# Patient Record
Sex: FEMALE | Race: BLACK OR AFRICAN AMERICAN | Employment: UNEMPLOYED | URBAN - METROPOLITAN AREA
[De-identification: names, ages, dates, MRNs, and addresses within clinical notes are randomized per-mention and may not be internally consistent; named-entity substitution may affect disease eponyms.]

---

## 2017-03-07 ENCOUNTER — OFFICE VISIT (OUTPATIENT)
Dept: INTERNAL MEDICINE CLINIC | Age: 57
End: 2017-03-07

## 2017-03-07 VITALS
SYSTOLIC BLOOD PRESSURE: 119 MMHG | BODY MASS INDEX: 35.08 KG/M2 | HEIGHT: 59 IN | WEIGHT: 174 LBS | DIASTOLIC BLOOD PRESSURE: 81 MMHG | HEART RATE: 82 BPM | OXYGEN SATURATION: 99 % | RESPIRATION RATE: 18 BRPM | TEMPERATURE: 98.1 F

## 2017-03-07 DIAGNOSIS — Z12.39 BREAST CANCER SCREENING: Primary | ICD-10-CM

## 2017-03-07 DIAGNOSIS — Z71.1 CONCERN ABOUT EYE DISEASE WITHOUT DIAGNOSIS: Primary | ICD-10-CM

## 2017-03-07 NOTE — PROGRESS NOTES
Chief Complaint   Patient presents with    Eye Problem     red spot on left side of eye x 1 week    Leg Pain     felt like a knot left leg on top only one time     1. Have you been to the ER, urgent care clinic since your last visit? Hospitalized since your last visit? No    2. Have you seen or consulted any other health care providers outside of the [facility] since your last visit? Include any pap smears or colon screening.  No

## 2017-03-07 NOTE — PROGRESS NOTES
Subjective: (As above and below)     Chief Complaint   Patient presents with    Eye Problem     red spot on left side of eye x 1 week    Leg Pain     felt like a knot left leg on top only one time    Immunization/Injection     Dannielle Goodwin is a 56y.o. year old female who presents for eye concern:    Her fried noticed a small red dot next to her right pupil approx 1 week ago. It is unchanged since then. She denies any pain, blurred vision, drainage. She does not wear contacts. She uses visine on occasion. She also mentions that after a day of doing yard work - that evening she felt that there was a \"ball inside of her leg that was crawling up her leg\". It occurred once, never again. No current leg problems. Reviewed PmHx, RxHx, FmHx, SocHx, AllgHx and updated in chart. Family History   Problem Relation Age of Onset    Alcohol abuse Mother     Alcohol abuse Father        Past Medical History:   Diagnosis Date    Anemia NEC     Bronchitis     Fibroid     Hypertension     Psychiatric disorder     Schizophrenia ([facility] [address]       Social History     Social History    Marital status: SINGLE     Spouse name: N/A    Number of children: N/A    Years of education: N/A     Social History Main Topics    Smoking status: Former Smoker    Smokeless tobacco: Never Used    Alcohol use No    Drug use: No    Sexual activity: Not Currently     Other Topics Concern    None     Social History Narrative          Current Outpatient Prescriptions   Medication Sig    ibuprofen (MOTRIN) 800 mg tablet TAKE 1 TABLET BY MOUTH EVERY EIGHT (8) HOURS AS NEEDED FOR PAIN. TAKE WITH FOOD.  traZODone (DESYREL) 100 mg tablet TAKE 1 TABLET BY MOUTH AT BEDTIME    lisinopril-hydrochlorothiazide (PRINZIDE, ZESTORETIC) 10-12.5 mg per tablet TAKE 1 TABLET EVERY DAY    benztropine (COGENTIN) 2 mg tablet Take 2 mg by mouth two (2) times a day.     INVEGA SUSTENNA 78 mg/0.5 mL injection     RISPERDAL CONSTA 25 mg/2 mL injection 25 mg by IntraMUSCular route every fourteen (14) days. No current facility-administered medications for this visit. Review of Systems:   Constitutional:    Negative for fever and chills, negative diaphoresis. HEENT:              Negative for neck pain and stiffness. Eyes:                  Negative for visual disturbance, itching, redness or discharge. Respiratory:        Negative for cough and shortness of breath. Cardiovascular:  Negative for chest pain and palpitations. Gastrointestinal: Negative for nausea, vomiting, abdominal pain, diarrhea or constipation. Genitourinary:     Negative for dysuria and frequency. Musculoskeletal: Negative for falls, tenderness and swelling. Skin:                    Negative for rash, masses or lesions. Neurological:       Negative for dizzyness, seizure, loss of consciousness, weakness and numbness. Objective:     Vitals:    03/07/17 1046   BP: 119/81   Pulse: 82   Resp: 18   Temp: 98.1 °F (36.7 °C)   TempSrc: Oral   SpO2: 99%   Weight: 174 lb (78.9 kg)   Height: 4' 11\" (1.499 m)       Results for orders placed or performed in visit on 07/02/14   AMB POC LIPID PROFILE   Result Value Ref Range    Cholesterol (POC) 213     Triglycerides (POC) 175     HDL Cholesterol (POC) 62     LDL Cholesterol (POC) 115     Non-HDL Goal (POC) 150     TChol/HDL Ratio (POC) 3.4          Physical Examination: General appearance - alert, well appearing, and in no distress  Eyes - pupils equal and reactive, extraocular eye movements intact. Small pinpoint red spot to lateral side of right pupil. No drainage, no conjunctivitis. Chest - clear to auscultation, no wheezes, rales or rhonchi, symmetric air entry  Heart - normal rate, regular rhythm, normal S1, S2, no murmurs, rubs, clicks or gallops  Skin - normal coloration and turgor, no rashes, no suspicious skin lesions noted      Assessment/ Plan:   Follow-up Disposition:  Return if symptoms worsen or fail to improve.     Leg: ?return if reccurs    1. Concern about eye disease without diagnosis  Reassured patient, likely small burst blood vessel,however, recc she est care with ophtho for routine eye exam  - REFERRAL TO OPHTHALMOLOGY      I have discussed the diagnosis with the patient and the intended plan as seen in the above orders. The patient has received an after-visit summary and questions were answered concerning future plans. Pt conveyed understanding of plan. Medication Side Effects and Warnings were discussed with patient: yes  Patient Labs were reviewed: yes  Patient Past Records were reviewed:  yes    Mary Lou Kumar.  Elina Solomon NP

## 2017-03-07 NOTE — MR AVS SNAPSHOT
Visit Information Date & Time Provider Department Dept. Phone Encounter #  
 3/7/2017 10:15 AM Ronna ESPINAL Jonna Soriano, [address] [id number] Upcoming Health Maintenance Date Due Hepatitis C Screening 1960 DTaP/Tdap/Td series (1 - Tdap) 3/1/1983 BREAST CANCER SCRN MAMMOGRAM 3/1/2012 FOBT Q 1 YEAR AGE 50-75 3/1/2012 PAP AKA CERVICAL CYTOLOGY 5/16/2015 INFLUENZA AGE 9 TO ADULT 8/1/2016 Allergies as of 3/7/2017  Review Complete On: 3/7/2017 By: Kassidy Alcala LPN No Known Allergies Current Immunizations  Never Reviewed Name Date Influenza Vaccine Intradermal PF 1/19/2015 Influenza Vaccine Split 10/18/2010 Not reviewed this visit You Were Diagnosed With   
  
 Codes Comments Encounter for immunization    -  Primary ICD-10-CM: W74 ICD-9-CM: V03.89 Concern about eye disease without diagnosis     ICD-10-CM: Z71.1 ICD-9-CM: V65.5 Vitals BP Pulse Temp Resp Height(growth percentile) Weight(growth percentile) 119/81 (BP 1 Location: Left arm, BP Patient Position: Sitting) 82 98.1 °F (36.7 °C) (Oral) 18 4' 11\" (1.499 m) 174 lb (78.9 kg) LMP SpO2 BMI OB Status Smoking Status 04/10/2011 99% 35.14 kg/m2 Hysterectomy Former Smoker BMI and BSA Data Body Mass Index Body Surface Area  
 35.14 kg/m 2 1.81 m 2 Preferred Pharmacy Pharmacy Name Phone [id number], [place] - [address] [phone] Your Updated Medication List  
  
   
This list is accurate as of: 3/7/17 11:07 AM.  Always use your most recent med list.  
  
  
  
  
 benztropine 2 mg tablet Commonly known as:  COGENTIN Take 2 mg by mouth two (2) times a day. ibuprofen 800 mg tablet Commonly known as:  MOTRIN  
TAKE 1 TABLET BY MOUTH EVERY EIGHT (8) HOURS AS NEEDED FOR PAIN. TAKE WITH FOOD. INVEGA SUSTENNA 78 mg/0.5 mL injection Generic drug:  Paliperidone Palmitate  
  
 lisinopril-hydroCHLOROthiazide 10-12.5 mg per tablet Commonly known as:  PRINZIDE, ZESTORETIC  
TAKE 1 TABLET EVERY DAY RisperDAL Consta 25 mg/2 mL injection Generic drug:  risperiDONE microspheres 25 mg by IntraMUSCular route every fourteen (14) days. traZODone 100 mg tablet Commonly known as:  DESYREL  
TAKE 1 TABLET BY MOUTH AT BEDTIME We Performed the Following REFERRAL TO OPHTHALMOLOGY [REF57 Custom] Comments:  
 Dr. Seth Danielsonvasyl 30 
[phone] Referral Information Referral ID Referred By Referred To  
  
 [id number] Alejandro Pump Z Not Available Visits Status Start Date End Date 1 New Request 3/7/17 3/7/18 If your referral has a status of pending review or denied, additional information will be sent to support the outcome of this decision. Introducing [facility]! Heriberto Soares introduces [employer] patient portal. Now you can access parts of your medical record, email your doctor's office, and request medication refills online. 1. In your internet browser, go to [email or web address] 2. Click on the First Time User? Click Here link in the Sign In box. You will see the New Member Sign Up page. 3. Enter your [employer] Access Code exactly as it appears below. You will not need to use this code after youve completed the sign-up process. If you do not sign up before the expiration date, you must request a new code. · [employer] Access Code: [email or web address] Expires: 6/5/2017 10:23 AM 
 
4. Enter the last four digits of your Social Security Number (xxxx) and Date of Birth (mm/dd/yyyy) as indicated and click Submit. You will be taken to the next sign-up page. 5. Create a [employer] ID. This will be your [employer] login ID and cannot be changed, so think of one that is secure and easy to remember. 6. Create a [employer] password. You can change your password at any time. 7. Enter your Password Reset Question and Answer. This can be used at a later time if you forget your password. 8. Enter your e-mail address. You will receive e-mail notification when new information is available in [address] 9. Click Sign Up. You can now view and download portions of your medical record. 10. Click the Download Summary menu link to download a portable copy of your medical information. If you have questions, please visit the Frequently Asked Questions section of the [employer] website. Remember, [employer] is NOT to be used for urgent needs. For medical emergencies, dial 911. Now available from your iPhone and Android! Please provide this summary of care documentation to your next provider. Your primary care clinician is listed as Jeannette Krabbe. If you have any questions after today's visit, please call [phone].

## 2017-05-26 ENCOUNTER — OFFICE VISIT (OUTPATIENT)
Dept: INTERNAL MEDICINE CLINIC | Age: 57
End: 2017-05-26

## 2017-05-26 ENCOUNTER — HOSPITAL ENCOUNTER (OUTPATIENT)
Dept: LAB | Age: 57
Discharge: HOME | End: 2017-05-26

## 2017-05-26 VITALS
SYSTOLIC BLOOD PRESSURE: 108 MMHG | OXYGEN SATURATION: 97 % | HEART RATE: 76 BPM | RESPIRATION RATE: 18 BRPM | BODY MASS INDEX: 35.9 KG/M2 | DIASTOLIC BLOOD PRESSURE: 82 MMHG | TEMPERATURE: 98.2 F | WEIGHT: 178.1 LBS | HEIGHT: 59 IN

## 2017-05-26 DIAGNOSIS — Z86.2 HISTORY OF ANEMIA: Primary | ICD-10-CM

## 2017-05-26 DIAGNOSIS — R10.13 EPIGASTRIC PAIN: ICD-10-CM

## 2017-05-26 DIAGNOSIS — Z12.31 SCREENING MAMMOGRAM, ENCOUNTER FOR: ICD-10-CM

## 2017-05-26 DIAGNOSIS — I10 ESSENTIAL HYPERTENSION: ICD-10-CM

## 2017-05-26 DIAGNOSIS — Z12.11 COLON CANCER SCREENING: ICD-10-CM

## 2017-05-26 DIAGNOSIS — Z11.59 SCREENING FOR VIRAL DISEASE: ICD-10-CM

## 2017-05-26 LAB — HGB BLD-MCNC: 10.5 G/DL

## 2017-05-26 PROCEDURE — 99001 SPECIMEN HANDLING PT-LAB: CPT | Performed by: NURSE PRACTITIONER

## 2017-05-26 RX ORDER — BENZTROPINE MESYLATE 0.5 MG/1
TABLET ORAL
Refills: 4 | COMMUNITY
Start: 2017-05-06 | End: 2018-01-02 | Stop reason: SDDI

## 2017-05-26 NOTE — MR AVS SNAPSHOT
Visit Information Date & Time Provider Department Dept. Phone Encounter #  
 5/26/2017  9:45 AM Ronna ESQUIVELSukhdeep Dyer, [address] [id number] Follow-up Instructions Return if symptoms worsen or fail to improve. Upcoming Health Maintenance Date Due Hepatitis C Screening 1960 DTaP/Tdap/Td series (1 - Tdap) 3/1/1983 BREAST CANCER SCRN MAMMOGRAM 3/1/2012 FOBT Q 1 YEAR AGE 50-75 3/1/2012 INFLUENZA AGE 9 TO ADULT 8/1/2017 PAP AKA CERVICAL CYTOLOGY 5/26/2020 Allergies as of 5/26/2017  Review Complete On: 5/26/2017 By: Yonatan Seay LPN No Known Allergies Current Immunizations  Never Reviewed Name Date Influenza Vaccine Intradermal PF 1/19/2015 Influenza Vaccine Split 10/18/2010 Not reviewed this visit You Were Diagnosed With   
  
 Codes Comments History of anemia    -  Primary ICD-10-CM: L61.0 ICD-9-CM: V12.3 Essential hypertension     ICD-10-CM: I10 
ICD-9-CM: 401.9 Screening for viral disease     ICD-10-CM: Z11.59 
ICD-9-CM: V73.99 Epigastric pain     ICD-10-CM: R10.13 ICD-9-CM: 789.06 Screening mammogram, encounter for     ICD-10-CM: Z12.31 
ICD-9-CM: V76.12 Colon cancer screening     ICD-10-CM: Z12.11 ICD-9-CM: V76.51 Vitals BP Pulse Temp Resp Height(growth percentile) Weight(growth percentile) 108/82 (BP 1 Location: Left arm, BP Patient Position: Sitting) 76 98.2 °F (36.8 °C) (Oral) 18 4' 11\" (1.499 m) 178 lb 1.6 oz (80.8 kg) LMP SpO2 BMI OB Status Smoking Status 04/10/2011 97% 35.97 kg/m2 Hysterectomy Former Smoker BMI and BSA Data Body Mass Index Body Surface Area  
 35.97 kg/m 2 1.83 m 2 Preferred Pharmacy Pharmacy Name Phone [id number] [address] [phone] Your Updated Medication List  
  
   
 This list is accurate as of: 5/26/17 10:37 AM.  Always use your most recent med list.  
  
  
  
  
 * benztropine 2 mg tablet Commonly known as:  COGENTIN Take 2 mg by mouth two (2) times a day. * benztropine 0.5 mg tablet Commonly known as:  COGENTIN  
TAKE 1 TABLET BY MOUTH AT BEDTIME  
  
 esomeprazole magnesium 22.3 mg Cpdr  
Commonly known as:  NexIUM 24HR Take 1 Tab by mouth daily. ibuprofen 800 mg tablet Commonly known as:  MOTRIN  
TAKE 1 TABLET BY MOUTH EVERY EIGHT (8) HOURS AS NEEDED FOR PAIN. TAKE WITH FOOD. INVEGA SUSTENNA 78 mg/0.5 mL injection Generic drug:  Paliperidone Palmitate  
  
 lisinopril-hydroCHLOROthiazide 10-12.5 mg per tablet Commonly known as:  PRINZIDE, ZESTORETIC  
TAKE 1 TABLET EVERY DAY RisperDAL Consta 25 mg/2 mL injection Generic drug:  risperiDONE microspheres 25 mg by IntraMUSCular route every fourteen (14) days. traZODone 100 mg tablet Commonly known as:  DESYREL  
TAKE 1 TABLET BY MOUTH AT BEDTIME  
  
 * Notice: This list has 2 medication(s) that are the same as other medications prescribed for you. Read the directions carefully, and ask your doctor or other care provider to review them with you. Prescriptions Sent to Pharmacy Refills  
 esomeprazole magnesium (NEXIUM 24HR) 22.3 mg cpDR 3 Sig: Take 1 Tab by mouth daily. Class: Normal  
 Pharmacy: [facility]/pharmacy [address] [facility] #: [phone] Route: Oral  
  
We Performed the Following AMB POC HEMOGLOBIN (HGB) [99440 CPT(R)] CBC W/O DIFF [19136 CPT(R)] HEPATITIS C AB [91660 CPT(R)] LIPID PANEL [06239 CPT(R)] METABOLIC PANEL, COMPREHENSIVE [66408 CPT(R)] REFERRAL TO GASTROENTEROLOGY [ELZ82 Custom] Comments:  
 Please evaluate patient for colon cancer screen Follow-up Instructions Return if symptoms worsen or fail to improve.   
  
To-Do List   
 05/26/2017 Imaging:  [facility] MAMMO BI SCREENING INCL CAD Referral Information Referral ID Referred By Referred To  
  
 [id number] DELFINA, [address] [facility] [address] Visits Status Start Date End Date 1 New Request 5/26/17 5/26/18 If your referral has a status of pending review or denied, additional information will be sent to support the outcome of this decision. Patient Instructions 1. Stop BC and motrin - tylenol is okay 2. Avoid spicy, greasy foods. Try to eat smaller meals 3. Follow up if worsening 4. Make your appt with stomach doctor for colon cancer screening Gastroesophageal Reflux Disease (GERD): Care Instructions Your Care Instructions Gastroesophageal reflux disease (GERD) is the backward flow of stomach acid into the esophagus. The esophagus is the tube that leads from your throat to your stomach. A one-way valve prevents the stomach acid from moving up into this tube. When you have GERD, this valve does not close tightly enough. If you have mild GERD symptoms including heartburn, you may be able to control the problem with antacids or over-the-counter medicine. Changing your diet, losing weight, and making other lifestyle changes can also help reduce symptoms. Follow-up care is a key part of your treatment and safety. Be sure to make and go to all appointments, and call your doctor if you are having problems. Its also a good idea to know your test results and keep a list of the medicines you take. How can you care for yourself at home? · Take your medicines exactly as prescribed. Call your doctor if you think you are having a problem with your medicine. · Your doctor may recommend over-the-counter medicine. For mild or occasional indigestion, antacids, such as Tums, Gaviscon, Mylanta, or Maalox, may help.  Your doctor also may recommend over-the-counter acid reducers, such as Pepcid AC, Tagamet HB, Zantac 75, or Prilosec. Read and follow all instructions on the label. If you use these medicines often, talk with your doctor. · Change your eating habits. ¨ Its best to eat several small meals instead of two or three large meals. ¨ After you eat, wait 2 to 3 hours before you lie down. ¨ Chocolate, mint, and alcohol can make GERD worse. ¨ Spicy foods, foods that have a lot of acid (like tomatoes and oranges), and coffee can make GERD symptoms worse in some people. If your symptoms are worse after you eat a certain food, you may want to stop eating that food to see if your symptoms get better. · Do not smoke or chew tobacco. Smoking can make GERD worse. If you need help quitting, talk to your doctor about stop-smoking programs and medicines. These can increase your chances of quitting for good. · If you have GERD symptoms at night, raise the head of your bed 6 to 8 inches by putting the frame on blocks or placing a foam wedge under the head of your mattress. (Adding extra pillows does not work.) · Do not wear tight clothing around your middle. · Lose weight if you need to. Losing just 5 to 10 pounds can help. When should you call for help? Call your doctor now or seek immediate medical care if: 
· You have new or different belly pain. · Your stools are black and tarlike or have streaks of blood. Watch closely for changes in your health, and be sure to contact your doctor if: 
· Your symptoms have not improved after 2 days. · Food seems to catch in your throat or chest. 
Where can you learn more? Go to http://patrick-yeny.info/. Enter Y478 in the search box to learn more about \"Gastroesophageal Reflux Disease (GERD): Care Instructions. \" Current as of: August 9, 2016 Content Version: 11.2 © 3859-2974 [employer].  Care instructions adapted under license by [employer] (which disclaims liability or warranty for this information). If you have questions about a medical condition or this instruction, always ask your healthcare professional. [facility] any warranty or liability for your use of this information. Introducing hospitals SERVICES! Luis Bermeo introduces [employer] patient portal. Now you can access parts of your medical record, email your doctor's office, and request medication refills online. 1. In your internet browser, go to [email or web address]://[email or web address]. [employer] 2. Click on the First Time User? Click Here link in the Sign In box. You will see the New Member Sign Up page. 3. Enter your [employer] Access Code exactly as it appears below. You will not need to use this code after youve completed the sign-up process. If you do not sign up before the expiration date, you must request a new code. · [employer] Access Code: [email or web address] Expires: 6/5/2017 11:23 AM 
 
4. Enter the last four digits of your Social Security Number (xxxx) and Date of Birth (mm/dd/yyyy) as indicated and click Submit. You will be taken to the next sign-up page. 5. Create a [employer] ID. This will be your [employer] login ID and cannot be changed, so think of one that is secure and easy to remember. 6. Create a [employer] password. You can change your password at any time. 7. Enter your Password Reset Question and Answer. This can be used at a later time if you forget your password. 8. Enter your e-mail address. You will receive e-mail notification when new information is available in [address] 9. Click Sign Up. You can now view and download portions of your medical record. 10. Click the Download Summary menu link to download a portable copy of your medical information. If you have questions, please visit the Frequently Asked Questions section of the [employer] website. Remember, [employer] is NOT to be used for urgent needs. For medical emergencies, dial 911. Now available from your iPhone and Android! Please provide this summary of care documentation to your next provider. Your primary care clinician is listed as Kyleigh Fulton. If you have any questions after today's visit, please call [phone].

## 2017-05-26 NOTE — PROGRESS NOTES
Subjective: (As above and below)     Chief Complaint   Patient presents with    Abdominal Pain     a couple of weeks     Ashlie Westbrook is a 56y.o. year old female who presents for abdominal pain for approx 2 weeks. She has epigastric pain x 2 weeks. It is unchanged. She has mild nausea in the morning which improves with bowel movement. Her BMs are normal. She is not vomiting. Her pain is not radiating. She is passing gas. No fevers. She was recently in [address] [place] [address] - she admits to eating spicy foods. She has been taking BC powder for her symptoms. Reviewed PmHx, RxHx, FmHx, SocHx, AllgHx and updated in chart. Family History   Problem Relation Age of Onset    Alcohol abuse Mother     Alcohol abuse Father        Past Medical History:   Diagnosis Date    Anemia NEC     Bronchitis     Fibroid     Hypertension     Psychiatric disorder     Schizophrenia ([facility] [address]       Social History     Social History    Marital status: SINGLE     Spouse name: N/A    Number of children: N/A    Years of education: N/A     Social History Main Topics    Smoking status: Former Smoker    Smokeless tobacco: Never Used    Alcohol use No    Drug use: No    Sexual activity: Not Currently     Other Topics Concern    None     Social History Narrative          Current Outpatient Prescriptions   Medication Sig    benztropine (COGENTIN) 0.5 mg tablet TAKE 1 TABLET BY MOUTH AT BEDTIME    esomeprazole magnesium (NEXIUM 24HR) 22.3 mg cpDR Take 1 Tab by mouth daily.  ibuprofen (MOTRIN) 800 mg tablet TAKE 1 TABLET BY MOUTH EVERY EIGHT (8) HOURS AS NEEDED FOR PAIN. TAKE WITH FOOD.  INVEGA SUSTENNA 78 mg/0.5 mL injection     traZODone (DESYREL) 100 mg tablet TAKE 1 TABLET BY MOUTH AT BEDTIME    lisinopril-hydrochlorothiazide (PRINZIDE, ZESTORETIC) 10-12.5 mg per tablet TAKE 1 TABLET EVERY DAY    RISPERDAL CONSTA 25 mg/2 mL injection 25 mg by IntraMUSCular route every fourteen (14) days.     benztropine (COGENTIN) 2 mg tablet Take 2 mg by mouth two (2) times a day. No current facility-administered medications for this visit. Review of Systems:   Constitutional:    Negative for fever and chills, negative diaphoresis. HEENT:              Negative for neck pain and stiffness. Eyes:                  Negative for visual disturbance, itching, redness or discharge. Respiratory:        Negative for cough and shortness of breath. Cardiovascular:  Negative for chest pain and palpitations. Gastrointestinal: Negative for vomiting,diarrhea or constipation. +nausea, epigastric pain  Genitourinary:     Negative for dysuria and frequency. Musculoskeletal: Negative for falls, tenderness and swelling. Skin:                    Negative for rash, masses or lesions. Neurological:       Negative for dizzyness, seizure, loss of consciousness, weakness and numbness. Objective:     Vitals:    05/26/17 0944   BP: 108/82   Pulse: 76   Resp: 18   Temp: 98.2 °F (36.8 °C)   TempSrc: Oral   SpO2: 97%   Weight: 178 lb 1.6 oz (80.8 kg)   Height: 4' 11\" (1.499 m)       Results for orders placed or performed in visit on 05/26/17   AMB POC HEMOGLOBIN (HGB)   Result Value Ref Range    Hemoglobin (POC) 10.5          Physical Examination: General appearance - alert, well appearing, and in no distress  Chest - clear to auscultation, no wheezes, rales or rhonchi, symmetric air entry  Heart - normal rate, regular rhythm, normal S1, S2, no murmurs, rubs, clicks or gallops  Abdomen - soft, mildly distended. BS active. Mild discomfort with palpation to epigastric area     Assessment/ Plan:   Follow-up Disposition:  Return if symptoms worsen or fail to improve.     1. History of anemia    - CBC W/O DIFF  - AMB POC HEMOGLOBIN (HGB)    2. Essential hypertension    - METABOLIC PANEL, COMPREHENSIVE  - LIPID PANEL    3. Screening for viral disease    - HEPATITIS C AB    4. Epigastric pain  Avoid spicy foods, STOP bc powder and motrin, tylenol okay  - esomeprazole magnesium (NEXIUM 24HR) 22.3 mg cpDR; Take 1 Tab by mouth daily. Dispense: 30 Tab; Refill: 3    5. Screening mammogram, encounter for    - [facility] MAMMO BI SCREENING INCL CAD; Future    6. Colon cancer screening    - REFERRAL TO GASTROENTEROLOGY        I have discussed the diagnosis with the patient and the intended plan as seen in the above orders. The patient has received an after-visit summary and questions were answered concerning future plans. Pt conveyed understanding of plan. Medication Side Effects and Warnings were discussed with patient: yes  Patient Labs were reviewed: yes  Patient Past Records were reviewed:  yes    Josh Coffman.  Prudencio Rosales NP

## 2017-05-26 NOTE — PATIENT INSTRUCTIONS
1. Stop BC and motrin - tylenol is okay  2. Avoid spicy, greasy foods. Try to eat smaller meals  3. Follow up if worsening  4. Make your appt with stomach doctor for colon cancer screening       Gastroesophageal Reflux Disease (GERD): Care Instructions  Your Care Instructions    Gastroesophageal reflux disease (GERD) is the backward flow of stomach acid into the esophagus. The esophagus is the tube that leads from your throat to your stomach. A one-way valve prevents the stomach acid from moving up into this tube. When you have GERD, this valve does not close tightly enough. If you have mild GERD symptoms including heartburn, you may be able to control the problem with antacids or over-the-counter medicine. Changing your diet, losing weight, and making other lifestyle changes can also help reduce symptoms. Follow-up care is a key part of your treatment and safety. Be sure to make and go to all appointments, and call your doctor if you are having problems. Its also a good idea to know your test results and keep a list of the medicines you take. How can you care for yourself at home? · Take your medicines exactly as prescribed. Call your doctor if you think you are having a problem with your medicine. · Your doctor may recommend over-the-counter medicine. For mild or occasional indigestion, antacids, such as Tums, Gaviscon, Mylanta, or Maalox, may help. Your doctor also may recommend over-the-counter acid reducers, such as Pepcid AC, Tagamet HB, Zantac 75, or Prilosec. Read and follow all instructions on the label. If you use these medicines often, talk with your doctor. · Change your eating habits. ¨ Its best to eat several small meals instead of two or three large meals. ¨ After you eat, wait 2 to 3 hours before you lie down. ¨ Chocolate, mint, and alcohol can make GERD worse.   ¨ Spicy foods, foods that have a lot of acid (like tomatoes and oranges), and coffee can make GERD symptoms worse in some people. If your symptoms are worse after you eat a certain food, you may want to stop eating that food to see if your symptoms get better. · Do not smoke or chew tobacco. Smoking can make GERD worse. If you need help quitting, talk to your doctor about stop-smoking programs and medicines. These can increase your chances of quitting for good. · If you have GERD symptoms at night, raise the head of your bed 6 to 8 inches by putting the frame on blocks or placing a foam wedge under the head of your mattress. (Adding extra pillows does not work.)  · Do not wear tight clothing around your middle. · Lose weight if you need to. Losing just 5 to 10 pounds can help. When should you call for help? Call your doctor now or seek immediate medical care if:  · You have new or different belly pain. · Your stools are black and tarlike or have streaks of blood. Watch closely for changes in your health, and be sure to contact your doctor if:  · Your symptoms have not improved after 2 days. · Food seems to catch in your throat or chest.  Where can you learn more? Go to http://patrick-yeny.info/. Enter G948 in the search box to learn more about \"Gastroesophageal Reflux Disease (GERD): Care Instructions. \"  Current as of: August 9, 2016  Content Version: 11.2  © 2829-9763 [employer]. Care instructions adapted under license by [employer] (which disclaims liability or warranty for this information). If you have questions about a medical condition or this instruction, always ask your healthcare professional. [place] [address] any warranty or liability for your use of this information.

## 2017-05-27 LAB
ALBUMIN SERPL-MCNC: 4.5 G/DL (ref 3.5–5.5)
ALBUMIN/GLOB SERPL: 1.4 {RATIO} (ref 1.2–2.2)
ALP SERPL-CCNC: 73 IU/L (ref 39–117)
ALT SERPL-CCNC: 22 IU/L (ref 0–32)
AST SERPL-CCNC: 25 IU/L (ref 0–40)
BILIRUB SERPL-MCNC: <0.2 MG/DL (ref 0–1.2)
BUN SERPL-MCNC: 10 MG/DL (ref 6–24)
BUN/CREAT SERPL: 11 (ref 9–23)
CALCIUM SERPL-MCNC: 9.9 MG/DL (ref 8.7–10.2)
CHLORIDE SERPL-SCNC: 97 MMOL/L (ref 96–106)
CHOLEST SERPL-MCNC: 233 MG/DL (ref 100–199)
CO2 SERPL-SCNC: 28 MMOL/L (ref 18–29)
CREAT SERPL-MCNC: 0.92 MG/DL (ref 0.57–1)
ERYTHROCYTE [DISTWIDTH] IN BLOOD BY AUTOMATED COUNT: 15.7 % (ref 12.3–15.4)
GLOBULIN SER CALC-MCNC: 3.2 G/DL (ref 1.5–4.5)
GLUCOSE SERPL-MCNC: 118 MG/DL (ref 65–99)
HCT VFR BLD AUTO: 40.5 % (ref 34–46.6)
HCV AB S/CO SERPL IA: 8.8 S/CO RATIO (ref 0–0.9)
HDLC SERPL-MCNC: 81 MG/DL
HGB BLD-MCNC: 13.1 G/DL (ref 11.1–15.9)
INTERPRETATION, 910389: NORMAL
LDLC SERPL CALC-MCNC: 122 MG/DL (ref 0–99)
MCH RBC QN AUTO: 28.4 PG (ref 26.6–33)
MCHC RBC AUTO-ENTMCNC: 32.3 G/DL (ref 31.5–35.7)
MCV RBC AUTO: 88 FL (ref 79–97)
PLATELET # BLD AUTO: 308 X10E3/UL (ref 150–379)
POTASSIUM SERPL-SCNC: 4.2 MMOL/L (ref 3.5–5.2)
PROT SERPL-MCNC: 7.7 G/DL (ref 6–8.5)
RBC # BLD AUTO: 4.62 X10E6/UL (ref 3.77–5.28)
SODIUM SERPL-SCNC: 142 MMOL/L (ref 134–144)
TRIGL SERPL-MCNC: 152 MG/DL (ref 0–149)
VLDLC SERPL CALC-MCNC: 30 MG/DL (ref 5–40)
WBC # BLD AUTO: 4.3 X10E3/UL (ref 3.4–10.8)

## 2017-05-31 ENCOUNTER — TELEPHONE (OUTPATIENT)
Dept: INTERNAL MEDICINE CLINIC | Age: 57
End: 2017-05-31

## 2017-05-31 PROBLEM — E78.2 MIXED HYPERLIPIDEMIA: Status: ACTIVE | Noted: 2017-05-31

## 2017-05-31 PROBLEM — R76.8 HEPATITIS C ANTIBODY POSITIVE IN BLOOD: Status: ACTIVE | Noted: 2017-05-31

## 2017-05-31 NOTE — TELEPHONE ENCOUNTER
Verified   Called with labs  Finding of Hep C ab + - she has no know hx of hep C  She happens to be going to GI appt today - will fax lab over

## 2017-06-08 DIAGNOSIS — Z12.11 COLON CANCER SCREENING: Primary | ICD-10-CM

## 2017-06-08 RX ORDER — LISINOPRIL AND HYDROCHLOROTHIAZIDE 10; 12.5 MG/1; MG/1
TABLET ORAL
Qty: 90 TAB | Refills: 2 | Status: SHIPPED | OUTPATIENT
Start: 2017-06-08 | End: 2017-09-23 | Stop reason: SDUPTHER

## 2017-07-18 ENCOUNTER — TELEPHONE (OUTPATIENT)
Dept: INTERNAL MEDICINE CLINIC | Age: 57
End: 2017-07-18

## 2017-07-18 NOTE — TELEPHONE ENCOUNTER
Pt has concerns regarding letter generated cancelling pharmacies.  Best contact number [phone].

## 2017-09-26 RX ORDER — LISINOPRIL AND HYDROCHLOROTHIAZIDE 10; 12.5 MG/1; MG/1
TABLET ORAL
Qty: 30 TAB | Refills: 2 | Status: SHIPPED | OUTPATIENT
Start: 2017-09-26 | End: 2017-09-27 | Stop reason: SDUPTHER

## 2017-09-27 ENCOUNTER — OFFICE VISIT (OUTPATIENT)
Dept: INTERNAL MEDICINE CLINIC | Age: 57
End: 2017-09-27

## 2017-09-27 VITALS
HEIGHT: 59 IN | TEMPERATURE: 98.2 F | HEART RATE: 69 BPM | OXYGEN SATURATION: 94 % | RESPIRATION RATE: 18 BRPM | WEIGHT: 176.1 LBS | DIASTOLIC BLOOD PRESSURE: 70 MMHG | SYSTOLIC BLOOD PRESSURE: 106 MMHG | BODY MASS INDEX: 35.5 KG/M2

## 2017-09-27 DIAGNOSIS — I10 ESSENTIAL HYPERTENSION: Primary | ICD-10-CM

## 2017-09-27 DIAGNOSIS — Z12.39 SCREENING FOR BREAST CANCER: ICD-10-CM

## 2017-09-27 DIAGNOSIS — Z12.11 SCREENING FOR COLON CANCER: ICD-10-CM

## 2017-09-27 RX ORDER — HYDROCHLOROTHIAZIDE 12.5 MG/1
12.5 TABLET ORAL DAILY
Qty: 30 TAB | Refills: 1 | Status: SHIPPED | OUTPATIENT
Start: 2017-09-27 | End: 2017-11-25 | Stop reason: SDUPTHER

## 2017-09-27 NOTE — PROGRESS NOTES
Subjective: (As above and below)     Chief Complaint   Patient presents with   Theone Serge is a 56y.o. year old female who presents for f/u on abdominal pain. She states that GERD symptoms have not resolved but have significantly improved. She states that she tried to get colonoscopy but could not due to the out of pocket costs. She states that she did see them for +hep c ab and was cleared - will request recs    Reviewed PmHx, RxHx, FmHx, SocHx, AllgHx and updated in chart. Family History   Problem Relation Age of Onset    Alcohol abuse Mother     Alcohol abuse Father        Past Medical History:   Diagnosis Date    Anemia NEC     Bronchitis     Fibroid     Hypertension     Psychiatric disorder     Schizophrenia ([facility] [address]       Social History     Social History    Marital status: SINGLE     Spouse name: N/A    Number of children: N/A    Years of education: N/A     Social History Main Topics    Smoking status: Former Smoker    Smokeless tobacco: Never Used    Alcohol use No    Drug use: No    Sexual activity: Not Currently     Other Topics Concern    None     Social History Narrative          Current Outpatient Prescriptions   Medication Sig    hydroCHLOROthiazide (HYDRODIURIL) 12.5 mg tablet Take 1 Tab by mouth daily.  benztropine (COGENTIN) 0.5 mg tablet TAKE 1 TABLET BY MOUTH AT BEDTIME    ibuprofen (MOTRIN) 800 mg tablet TAKE 1 TABLET BY MOUTH EVERY EIGHT (8) HOURS AS NEEDED FOR PAIN. TAKE WITH FOOD.  esomeprazole magnesium (NEXIUM 24HR) 22.3 mg cpDR Take 1 Tab by mouth daily.  INVEGA SUSTENNA 78 mg/0.5 mL injection     RISPERDAL CONSTA 25 mg/2 mL injection 25 mg by IntraMUSCular route every fourteen (14) days. No current facility-administered medications for this visit. Review of Systems:   Constitutional:    Negative for fever and chills, negative diaphoresis. HEENT:              Negative for neck pain and stiffness.   Eyes: Negative for visual disturbance, itching, redness or discharge. Respiratory:        Negative for cough and shortness of breath. Cardiovascular:  Negative for chest pain and palpitations. Gastrointestinal: Negative for nausea, vomiting, abdominal pain, diarrhea or constipation. Genitourinary:     Negative for dysuria and frequency. Musculoskeletal: Negative for falls, tenderness and swelling. Skin:                    Negative for rash, masses or lesions. Neurological:       Negative for dizzyness, seizure, loss of consciousness, weakness and numbness. Objective:     Vitals:    09/27/17 0940   BP: 106/70   Pulse: 69   Resp: 18   Temp: 98.2 °F (36.8 °C)   TempSrc: Oral   SpO2: 94%   Weight: 176 lb 1.6 oz (79.9 kg)   Height: 4' 11\" (1.499 m)           Physical Examination: General appearance - alert, well appearing, and in no distress  Chest - clear to auscultation, no wheezes, rales or rhonchi, symmetric air entry  Heart - normal rate, regular rhythm, normal S1, S2, no murmurs, rubs, clicks or gallops  Extremities - No lower extremity edema       Assessment/ Plan:   Follow-up Disposition:  Return for nv bp check. 1. Essential hypertension  She asks if she can be off bp med - bp has been low-normal, will dc lisinopril  - hydroCHLOROthiazide (HYDRODIURIL) 12.5 mg tablet; Take 1 Tab by mouth daily. Dispense: 30 Tab; Refill: 1    2. Screening for colon cancer  If unable to get colonoscopy, recc  - OCCULT BLOOD, IMMUNOASSAY (FIT)    3. Screening for breast cancer    - [facility] MAMMO BI SCREENING INCL CAD; Future        I have discussed the diagnosis with the patient and the intended plan as seen in the above orders. The patient has received an after-visit summary and questions were answered concerning future plans. Pt conveyed understanding of plan.       Medication Side Effects and Warnings were discussed with patient: yes  Patient Labs were reviewed: yes  Patient Past Records were reviewed: yes    Ronna Sam Age, NP

## 2017-09-27 NOTE — MR AVS SNAPSHOT
Visit Information Date & Time Provider Department Dept. Phone Encounter #  
 9/27/2017 10:15 AM Ronna ESQUIVELSukhdeep Kristoferpo Sena, [address] [id number] Follow-up Instructions Return for nv bp check. Your Appointments 9/27/2017 10:15 AM  
ROUTINE CARE with Ronnamanolo Sena, NP  
[address] [facility] [address] [place] [address]) Appt Note: check up; check up [place] [address] [facility] 7 [address]  
[phone]  
  
   
 [place] [address] Upcoming Health Maintenance Date Due DTaP/Tdap/Td series (1 - Tdap) 3/1/1983 BREAST CANCER SCRN MAMMOGRAM 3/1/2012 FOBT Q 1 YEAR AGE 50-75 3/1/2012 PAP AKA CERVICAL CYTOLOGY 5/26/2020 Allergies as of 9/27/2017  Review Complete On: 9/27/2017 By: Ilda Zuleta LPN No Known Allergies Current Immunizations  Never Reviewed Name Date Influenza Vaccine Intradermal PF 1/19/2015 Influenza Vaccine Split 10/18/2010 Not reviewed this visit You Were Diagnosed With   
  
 Codes Comments Essential hypertension    -  Primary ICD-10-CM: I10 
ICD-9-CM: 401.9 Screening for colon cancer     ICD-10-CM: Z12.11 ICD-9-CM: V76.51 Screening for breast cancer     ICD-10-CM: Z12.39 
ICD-9-CM: V76.10 Vitals BP Pulse Temp Resp Height(growth percentile) Weight(growth percentile) 106/70 (BP 1 Location: Left arm, BP Patient Position: Sitting) 69 98.2 °F (36.8 °C) (Oral) 18 4' 11\" (1.499 m) 176 lb 1.6 oz (79.9 kg) LMP SpO2 BMI OB Status Smoking Status 04/10/2011 94% 35.57 kg/m2 Hysterectomy Former Smoker BMI and BSA Data Body Mass Index Body Surface Area 35.57 kg/m 2 1.82 m 2 Preferred Pharmacy Pharmacy Name Phone [id number]- [place], [place] - [address] [phone] Your Updated Medication List  
  
   
 This list is accurate as of: 9/27/17 10:03 AM.  Always use your most recent med list.  
  
  
  
  
 benztropine 0.5 mg tablet Commonly known as:  COGENTIN  
TAKE 1 TABLET BY MOUTH AT BEDTIME  
  
 esomeprazole magnesium 22.3 mg Cpdr  
Commonly known as:  NexIUM 24HR Take 1 Tab by mouth daily. hydroCHLOROthiazide 12.5 mg tablet Commonly known as:  HYDRODIURIL Take 1 Tab by mouth daily. ibuprofen 800 mg tablet Commonly known as:  MOTRIN  
TAKE 1 TABLET BY MOUTH EVERY EIGHT (8) HOURS AS NEEDED FOR PAIN. TAKE WITH FOOD. INVEGA SUSTENNA 78 mg/0.5 mL injection Generic drug:  Paliperidone Palmitate RisperDAL Consta 25 mg/2 mL injection Generic drug:  risperiDONE microspheres 25 mg by IntraMUSCular route every fourteen (14) days. Prescriptions Sent to Pharmacy Refills  
 hydroCHLOROthiazide (HYDRODIURIL) 12.5 mg tablet 1 Sig: Take 1 Tab by mouth daily. Class: Normal  
 Pharmacy: [facility]/pharmacy [address] [place] [address] [address]  #: [phone] Route: Oral  
  
We Performed the Following OCCULT BLOOD, IMMUNOASSAY (FIT) [id number] CPT(R)] Follow-up Instructions Return for nv bp check. To-Do List   
 09/27/2017 Imaging:  [facility] MAMMO BI SCREENING INCL CAD Patient Instructions Fecal Immunochemical Test (FIT): About This Test 
What is it? A fecal immunochemical test, or FIT, checks for hidden blood in the stool. Your doctor gives you a kit that contains everything you need. At home, you follow simple steps to collect a small amount of stool. You return the kit to the doctor or to a lab. Why is this test done? This test is done to check for colorectal cancer and other types of gastrointestinal problems, such as hemorrhoids, anal fissures, and colon polyps, that can cause blood in the stools.  
How can you prepare for the test? 
 · Don't do the test during your menstrual period or if you are having bleeding from hemorrhoids. What happens during the test? 
There are different types of home tests available. It is important to follow the instructions provided with any test. 
Here are some general instructions: 
· Check the expiration date on the package. Don't use a test kit after its expiration date. · Follow the instructions exactly. Do all the steps, in order, without skipping any of them. · After you finish your test, follow the instructions that you were given for returning the test. 
There is a FIT test that shows the results right away. If your test shows that blood was found in your stool sample, call your doctor as soon as possible. Follow-up care is a key part of your treatment and safety. Be sure to make and go to all appointments, and call your doctor if you are having problems. It's also a good idea to keep a list of the medicines you take. Ask your doctor when you can expect to have your test results. Where can you learn more? Go to http://patrick-yeny.info/. Enter F692 in the search box to learn more about \"Fecal Immunochemical Test (FIT): About This Test.\" Current as of: July 28, 2016 Content Version: 11.3 © 7163-5372 [employer]. Care instructions adapted under license by [employer] (which disclaims liability or warranty for this information). If you have questions about a medical condition or this instruction, always ask your healthcare professional. [place] [address] any warranty or liability for your use of this information. Introducing [facility] & HE[facility]! Marilou Villa introduces Plored patient portal. Now you can access parts of your medical record, email your doctor's office, and request medication refills online. 1. In your internet browser, go to [email or web address] 2. Click on the First Time User? Click Here link in the Sign In box. You will see the New Member Sign Up page. 3. Enter your LineHop Access Code exactly as it appears below. You will not need to use this code after youve completed the sign-up process. If you do not sign up before the expiration date, you must request a new code. · LineHop Access Code: [email or web address] Expires: 12/26/2017  9:22 AM 
 
4. Enter the last four digits of your Social Security Number (xxxx) and Date of Birth (mm/dd/yyyy) as indicated and click Submit. You will be taken to the next sign-up page. 5. Create a LineHop ID. This will be your LineHop login ID and cannot be changed, so think of one that is secure and easy to remember. 6. Create a LineHop password. You can change your password at any time. 7. Enter your Password Reset Question and Answer. This can be used at a later time if you forget your password. 8. Enter your e-mail address. You will receive e-mail notification when new information is available in [address] 9. Click Sign Up. You can now view and download portions of your medical record. 10. Click the Download Summary menu link to download a portable copy of your medical information. If you have questions, please visit the Frequently Asked Questions section of the LineHop website. Remember, LineHop is NOT to be used for urgent needs. For medical emergencies, dial 911. Now available from your iPhone and Android! Please provide this summary of care documentation to your next provider. Your primary care clinician is listed as Manuelito Valenzuela. If you have any questions after today's visit, please call [phone].

## 2017-09-27 NOTE — PATIENT INSTRUCTIONS
Fecal Immunochemical Test (FIT): About This Test  What is it? A fecal immunochemical test, or FIT, checks for hidden blood in the stool. Your doctor gives you a kit that contains everything you need. At home, you follow simple steps to collect a small amount of stool. You return the kit to the doctor or to a lab. Why is this test done? This test is done to check for colorectal cancer and other types of gastrointestinal problems, such as hemorrhoids, anal fissures, and colon polyps, that can cause blood in the stools. How can you prepare for the test?  · Don't do the test during your menstrual period or if you are having bleeding from hemorrhoids. What happens during the test?  There are different types of home tests available. It is important to follow the instructions provided with any test.  Here are some general instructions:  · Check the expiration date on the package. Don't use a test kit after its expiration date. · Follow the instructions exactly. Do all the steps, in order, without skipping any of them. · After you finish your test, follow the instructions that you were given for returning the test.  There is a FIT test that shows the results right away. If your test shows that blood was found in your stool sample, call your doctor as soon as possible. Follow-up care is a key part of your treatment and safety. Be sure to make and go to all appointments, and call your doctor if you are having problems. It's also a good idea to keep a list of the medicines you take. Ask your doctor when you can expect to have your test results. Where can you learn more? Go to http://patrick-yeny.info/. Enter K823 in the search box to learn more about \"Fecal Immunochemical Test (FIT): About This Test.\"  Current as of: July 28, 2016  Content Version: 11.3  © 9586-9038 [employer].  Care instructions adapted under license by [employer] (which disclaims liability or warranty for this information). If you have questions about a medical condition or this instruction, always ask your healthcare professional. [place] [address] any warranty or liability for your use of this information.

## 2017-09-27 NOTE — PROGRESS NOTES
Pt here for   Chief Complaint   Patient presents with    Follow-up     Blood Pressure     1. Have you been to the ER, urgent care clinic since your last visit? Hospitalized since your last visit? No    2. Have you seen or consulted any other health care providers outside of the [address] [place] [address] since your last visit? Include any pap smears or colon screening.  No     Pt denies pain at this time      PHQ over the last two weeks 9/27/2017   PHQ Not Done -   Little interest or pleasure in doing things Not at all   Feeling down, depressed or hopeless Not at all   Total Score PHQ 2 0

## 2017-11-25 DIAGNOSIS — I10 ESSENTIAL HYPERTENSION: ICD-10-CM

## 2017-11-27 RX ORDER — HYDROCHLOROTHIAZIDE 12.5 MG/1
TABLET ORAL
Qty: 30 TAB | Refills: 1 | Status: SHIPPED | OUTPATIENT
Start: 2017-11-27 | End: 2018-01-29 | Stop reason: SDUPTHER

## 2017-12-04 DIAGNOSIS — G89.29 CHRONIC PAIN OF LEFT KNEE: ICD-10-CM

## 2017-12-04 DIAGNOSIS — M25.562 CHRONIC PAIN OF LEFT KNEE: ICD-10-CM

## 2017-12-04 RX ORDER — IBUPROFEN 800 MG/1
TABLET ORAL
Qty: 60 TAB | Refills: 5 | Status: SHIPPED | OUTPATIENT
Start: 2017-12-04 | End: 2018-05-25 | Stop reason: SDUPTHER

## 2017-12-22 ENCOUNTER — OFFICE VISIT (OUTPATIENT)
Dept: INTERNAL MEDICINE CLINIC | Age: 57
End: 2017-12-22

## 2017-12-22 VITALS
SYSTOLIC BLOOD PRESSURE: 128 MMHG | OXYGEN SATURATION: 98 % | RESPIRATION RATE: 20 BRPM | DIASTOLIC BLOOD PRESSURE: 88 MMHG | TEMPERATURE: 97.8 F | HEART RATE: 84 BPM | HEIGHT: 59 IN | BODY MASS INDEX: 35.88 KG/M2 | WEIGHT: 178 LBS

## 2017-12-22 DIAGNOSIS — I10 ESSENTIAL HYPERTENSION: Primary | ICD-10-CM

## 2017-12-22 DIAGNOSIS — F20.9 SCHIZOPHRENIA, UNSPECIFIED TYPE (HCC): ICD-10-CM

## 2017-12-22 NOTE — PROGRESS NOTES
Marcelo Smith is a 56 y.o. female and presents with Leg Pain  . Subjective:    Hypertension Review:  The patient has essential hypertension  Diet and Lifestyle: generally follows a  low sodium diet, exercises sporadically  Home BP Monitoring: is not measured at home. Pertinent ROS: taking medications as instructed, no medication side effects noted, no TIA's, no chest pain on exertion, no dyspnea on exertion, no swelling of ankles. BP Readings from Last 3 Encounters:   12/22/17 128/88   09/27/17 106/70   05/26/17 108/82       Schizophrenia-noted     Hep C-?? Evaluated by GI already. Records requested  Review of Systems  Constitutional: negative for fevers, chills, anorexia and weight loss  Eyes:   negative for visual disturbance and irritation  ENT:   negative for tinnitus,sore throat,nasal congestion,ear pains. hoarseness  Respiratory:  negative for cough, hemoptysis, dyspnea,wheezing  CV:   negative for chest pain, palpitations, lower extremity edema  GI:   negative for nausea, vomiting, diarrhea, abdominal pain,melena  Endo:               negative for polyuria,polydipsia,polyphagia,heat intolerance  Genitourinary: negative for frequency, dysuria and hematuria  Integument:  negative for rash and pruritus  Hematologic:  negative for easy bruising and gum/nose bleeding  Musculoskel: negative for myalgias, arthralgias, back pain, muscle weakness, joint pain  Neurological:  negative for headaches, dizziness, vertigo, memory problems and gait   Behavl/Psych: negative for feelings of anxiety, depression, mood changes    Past Medical History:   Diagnosis Date    Anemia NEC     Bronchitis     Fibroid     Hypertension     Positive hepatitis C antibody test 09/2017    [facility]    Psychiatric disorder     Schizophrenia [facility])      Past Surgical History:   Procedure Laterality Date    HX MYOMECTOMY       Social History     Social History    Marital status: SINGLE     Spouse name: N/A  Number of children: N/A    Years of education: N/A     Social History Main Topics    Smoking status: Former Smoker    Smokeless tobacco: Never Used    Alcohol use No    Drug use: No    Sexual activity: Not Currently     Other Topics Concern    None     Social History Narrative     Family History   Problem Relation Age of Onset    Alcohol abuse Mother     Alcohol abuse Father      Current Outpatient Prescriptions   Medication Sig Dispense Refill    ibuprofen (MOTRIN) 800 mg tablet TAKE 1 TABLET BY MOUTH EVERY EIGHT (8) HOURS AS NEEDED FOR PAIN. TAKE WITH FOOD. 60 Tab 5    hydroCHLOROthiazide (HYDRODIURIL) 12.5 mg tablet TAKE 1 TAB BY MOUTH DAILY. 30 Tab 1    INVEGA SUSTENNA 78 mg/0.5 mL injection       esomeprazole magnesium (NEXIUM 24HR) 22.3 mg cpDR Take 1 Tab by mouth daily. 30 Tab 3     No Known Allergies    Objective:  Visit Vitals    /88 (BP 1 Location: Left arm, BP Patient Position: Sitting)    Pulse 84    Temp 97.8 °F (36.6 °C) (Oral)    Resp 20    Ht 4' 11\" (1.499 m)    Wt 178 lb (80.7 kg)    LMP 04/10/2011    SpO2 98%    BMI 35.95 kg/m2     Physical Exam:   General appearance - alert, well appearing, and in no distress obese  Mental status - alert, oriented to person, place, and time  EYE-EOMI  Mouth - mucous membranes moist, pharynx normal without lesions  Neck - supple, no significant adenopathy   Chest - clear to auscultation, no wheezes, rales or rhonchi, symmetric air entry   Heart - normal rate, regular rhythm, normal S1, S2  Abdomen - soft, obese  Ext-peripheral pulses normal, no pedal edema, no clubbing or cyanosis  Skin-Warm and dry.  no hyperpigmentation, vitiligo, or suspicious lesions  Neuro -alert, oriented, normal speech, no focal findings or movement disorder noted      Results for orders placed or performed in visit on 66/47/68   METABOLIC PANEL, COMPREHENSIVE   Result Value Ref Range    Glucose 118 (H) 65 - 99 mg/dL    BUN 10 6 - 24 mg/dL    Creatinine 0.92 0.57 - 1.00 mg/dL    GFR est non-AA 70 >59 mL/min/1.73    GFR est AA 81 >59 mL/min/1.73    BUN/Creatinine ratio 11 9 - 23    Sodium 142 134 - 144 mmol/L    Potassium 4.2 3.5 - 5.2 mmol/L    Chloride 97 96 - 106 mmol/L    CO2 28 18 - 29 mmol/L    Calcium 9.9 8.7 - 10.2 mg/dL    Protein, total 7.7 6.0 - 8.5 g/dL    Albumin 4.5 3.5 - 5.5 g/dL    GLOBULIN, TOTAL 3.2 1.5 - 4.5 g/dL    A-G Ratio 1.4 1.2 - 2.2    Bilirubin, total <0.2 0.0 - 1.2 mg/dL    Alk. phosphatase 73 39 - 117 IU/L    AST (SGOT) 25 0 - 40 IU/L    ALT (SGPT) 22 0 - 32 IU/L   LIPID PANEL   Result Value Ref Range    Cholesterol, total 233 (H) 100 - 199 mg/dL    Triglyceride 152 (H) 0 - 149 mg/dL    HDL Cholesterol 81 >39 mg/dL    VLDL, calculated 30 5 - 40 mg/dL    LDL, calculated 122 (H) 0 - 99 mg/dL   CBC W/O DIFF   Result Value Ref Range    WBC 4.3 3.4 - 10.8 x10E3/uL    RBC 4.62 3.77 - 5.28 x10E6/uL    HGB 13.1 11.1 - 15.9 g/dL    HCT 40.5 34.0 - 46.6 %    MCV 88 79 - 97 fL    MCH 28.4 26.6 - 33.0 pg    MCHC 32.3 31.5 - 35.7 g/dL    RDW 15.7 (H) 12.3 - 15.4 %    PLATELET 184 534 - 623 x10E3/uL   HEPATITIS C AB   Result Value Ref Range    Hep C Virus Ab 8.8 (H) 0.0 - 0.9 s/co ratio   CVD REPORT   Result Value Ref Range    INTERPRETATION Note    AMB POC HEMOGLOBIN (HGB)   Result Value Ref Range    Hemoglobin (POC) 10.5        Assessment/Plan:    ICD-10-CM ICD-9-CM    1. Essential hypertension I10 401.9    2. [facility] unspecified [facility] F20.9 295.90      No orders of the defined types were placed in this encounter. lose weight, increase physical activity, continue present plan,Take 81mg aspirin daily  Patient Instructions        Low Sodium Diet (2,000 Milligram): Care Instructions  Your Care Instructions    Too much sodium causes your body to hold on to extra water. This can raise your blood pressure and force your heart and kidneys to work harder.  In very serious cases, this could cause you to be put in the hospital. It might even be life-threatening. By limiting sodium, you will feel better and lower your risk of serious problems. The most common source of sodium is salt. People get most of the salt in their diet from canned, prepared, and packaged foods. Fast food and restaurant meals also are very high in sodium. Your doctor will probably limit your sodium to less than 2,000 milligrams (mg) a day. This limit counts all the sodium in prepared and packaged foods and any salt you add to your food. Follow-up care is a key part of your treatment and safety. Be sure to make and go to all appointments, and call your doctor if you are having problems. It's also a good idea to know your test results and keep a list of the medicines you take. How can you care for yourself at home? Read food labels  · Read labels on cans and food packages. The labels tell you how much sodium is in each serving. Make sure that you look at the serving size. If you eat more than the serving size, you have eaten more sodium. · Food labels also tell you the Percent Daily Value for sodium. Choose products with low Percent Daily Values for sodium. · Be aware that sodium can come in forms other than salt, including monosodium glutamate (MSG), sodium citrate, and sodium bicarbonate (baking soda). MSG is often added to Asian food. When you eat out, you can sometimes ask for food without MSG or added salt. Buy low-sodium foods  · Buy foods that are labeled \"unsalted\" (no salt added), \"sodium-free\" (less than 5 mg of sodium per serving), or \"low-sodium\" (less than 140 mg of sodium per serving). Foods labeled \"reduced-sodium\" and \"light sodium\" may still have too much sodium. Be sure to read the label to see how much sodium you are getting. · Buy fresh vegetables, or frozen vegetables without added sauces. Buy low-sodium versions of canned vegetables, soups, and other canned goods. Prepare low-sodium meals  · Cut back on the amount of salt you use in cooking.  This will help you adjust to the taste. Do not add salt after cooking. One teaspoon of salt has about 2,300 mg of sodium. · Take the salt shaker off the table. · Flavor your food with garlic, lemon juice, onion, vinegar, herbs, and spices. Do not use soy sauce, lite soy sauce, steak sauce, onion salt, garlic salt, celery salt, mustard, or ketchup on your food. · Use low-sodium salad dressings, sauces, and ketchup. Or make your own salad dressings and sauces without adding salt. · Use less salt (or none) when recipes call for it. You can often use half the salt a recipe calls for without losing flavor. Other foods such as rice, pasta, and grains do not need added salt. · Rinse canned vegetables, and cook them in fresh water. This removes some-but not all-of the salt. · Avoid water that is naturally high in sodium or that has been treated with water softeners, which add sodium. Call your local water company to find out the sodium content of your water supply. If you buy bottled water, read the label and choose a sodium-free brand. Avoid high-sodium foods  · Avoid eating:  ¨ Smoked, cured, salted, and canned meat, fish, and poultry. ¨ Ham, davis, hot dogs, and luncheon meats. ¨ Regular, hard, and processed cheese and regular peanut butter. ¨ Crackers with salted tops, and other salted snack foods such as pretzels, chips, and salted popcorn. ¨ Frozen prepared meals, unless labeled low-sodium. ¨ Canned and dried soups, broths, and bouillon, unless labeled sodium-free or low-sodium. ¨ Canned vegetables, unless labeled sodium-free or low-sodium. ¨ [place] fries, pizza, tacos, and other fast foods. ¨ Pickles, olives, ketchup, and other condiments, especially soy sauce, unless labeled sodium-free or low-sodium. Where can you learn more? Go to http://patrick-yeny.info/. Enter T083 in the search box to learn more about \"Low Sodium Diet (2,000 Milligram): Care Instructions. \"  Current as of:  May 12, 2017  Content Version: 11.4  © 6310-5640 Healthwise, Incorporated. Care instructions adapted under license by [employer] (which disclaims liability or warranty for this information). If you have questions about a medical condition or this instruction, always ask your healthcare professional. [address] any warranty or liability for your use of this information. Follow-up Disposition:  Return in about 3 months (around 3/22/2018) for bp check. I have reviewed with the patient details of the assessment and plan and all questions were answered. Relevent patient education was performed. The most recent lab findings were reviewed with the patient. An After Visit Summary was printed and given to the patient.

## 2017-12-22 NOTE — PROGRESS NOTES
1. Have you been to the ER, urgent care clinic since your last visit? Hospitalized since your last visit? No.    2. Have you seen or consulted any other health care providers outside of the [address] [place] [address] since your last visit? Include any pap smears or colon screening.  No.

## 2018-01-02 NOTE — PATIENT INSTRUCTIONS

## 2018-01-22 ENCOUNTER — OFFICE VISIT (OUTPATIENT)
Dept: INTERNAL MEDICINE CLINIC | Age: 58
End: 2018-01-22

## 2018-01-22 VITALS
HEIGHT: 59 IN | TEMPERATURE: 98.2 F | BODY MASS INDEX: 34.51 KG/M2 | SYSTOLIC BLOOD PRESSURE: 136 MMHG | WEIGHT: 171.2 LBS | HEART RATE: 94 BPM | DIASTOLIC BLOOD PRESSURE: 88 MMHG | OXYGEN SATURATION: 99 % | RESPIRATION RATE: 20 BRPM

## 2018-01-22 DIAGNOSIS — S83.91XA SPRAIN OF RIGHT KNEE, UNSPECIFIED LIGAMENT, INITIAL ENCOUNTER: Primary | ICD-10-CM

## 2018-01-22 DIAGNOSIS — F20.9 SCHIZOPHRENIA, UNSPECIFIED TYPE (HCC): ICD-10-CM

## 2018-01-22 DIAGNOSIS — R76.8 HEPATITIS C ANTIBODY POSITIVE IN BLOOD: ICD-10-CM

## 2018-01-22 DIAGNOSIS — E78.2 MIXED HYPERLIPIDEMIA: ICD-10-CM

## 2018-01-22 NOTE — PROGRESS NOTES
1. Have you been to the ER, urgent care clinic since your last visit? Hospitalized since your last visit? No.    2. Have you seen or consulted any other health care providers outside of the [address] [place] [address] since your last visit? Include any pap smears or colon screening.  No.

## 2018-01-22 NOTE — PATIENT INSTRUCTIONS
Low-Fat Diet : Care Instructions  Your Care Instructions    Follow-up care is a key part of your treatment and safety. Be sure to make and go to all appointments, and call your doctor if you are having problems. It's also a good idea to know your test results and keep a list of the medicines you take. How can you care for yourself at home? · Eat many small meals and snacks each day instead of three large meals. · Choose lean meats. ¨ Eat no more than 5 to 6½ ounces of meat a day. ¨ Cut off all fat you can see. ¨ Eat chicken and turkey without the skin. ¨ Many types of fish, such as salmon, lake trout, tuna, and herring, provide healthy omega-3 fat. But, avoid fish canned in oil, such as sardines in olive oil. ¨ Bake, broil, or grill meats, poultry, or fish instead of frying them in butter or fat. · Drink or eat nonfat or low-fat milk, yogurt, cheese, or other milk products each day. ¨ Read the labels on cheeses, and choose those with less than 5 grams of fat an ounce. ¨ Try fat-free sour cream, cream cheese, or yogurt. ¨ Avoid cream soups and cream sauces on pasta. ¨ Eat low-fat ice cream, frozen yogurt, or sorbet. Avoid regular ice cream.  · Eat whole-grain cereals, breads, crackers, rice, or pasta. Avoid high-fat foods such as croissants, scones, biscuits, waffles, doughnuts, muffins, granola, and high-fat breads. · Flavor your foods with herbs and spices (such as basil, tarragon, or mint), fat-free sauces, or lemon juice instead of butter. You can also use butter substitutes, fat-free mayonnaise, or fat-free dressing. · Try applesauce, prune puree, or mashed bananas to replace some or all of the fat when you bake. · Limit fats and oils, such as butter, margarine, mayonnaise, and salad dressing, to no more than 1 tablespoon a meal.  · Avoid high-fat foods, such as:  ¨ Chocolate, whole milk, ice cream, and processed cheese. ¨ Fried or buttered foods. ¨ Sausage, salami, and davis.   [place] rolls, cakes, pies, cookies, and other pastries. ¨ Prepared snack foods, such as potato chips, nut and granola bars, and mixed nuts. ¨ Coconut and avocado. · Learn how to read food labels for serving sizes and ingredients. Fast-food and convenience-food meals often have lots of fat. Where can you learn more? Go to http://patrick-yeny.info/. Enter S962 in the search box to learn more about \"Low-Fat Diet for Gallbladder Disease: Care Instructions. \"  Current as of: May 12, 2017  Content Version: 11.4  © 7744-9076 [employer]. Care instructions adapted under license by [employer] (which disclaims liability or warranty for this information). If you have questions about a medical condition or this instruction, always ask your healthcare professional. [address] any warranty or liability for your use of this information.

## 2018-01-22 NOTE — PROGRESS NOTES
Brenden Hobson is a 56 y.o. female and presents with Fall; Knee Swelling (right); and Medication Refill  . Subjective:    S/p fall 5 days ago. She slipped going down her stairs. Pts rt knee has been swollen and painful. She has been soaking in hot tub w epsom salt and the sxs have improved. She is able to walk. The swelling has gone down significantly    Pt also wants to be retested for hep C. Pt relays she saw GI in May and was told she does NOT have hep C. Consult notes received do not mention retesting. Pt was supposed to schedule colonoscopy, but did not f/u bc she was told she \"might have to pay\". Pt also requests that she recheck her cholesterol as she was told it was elevated. Lab Results   Component Value Date/Time    Cholesterol, total 233 05/26/2017 12:00 AM    Cholesterol (POC) 213 07/02/2014 11:15 AM    HDL Cholesterol 81 05/26/2017 12:00 AM    HDL Cholesterol (POC) 62 07/02/2014 11:15 AM    LDL Cholesterol (POC) 115 07/02/2014 11:15 AM    LDL, calculated 122 05/26/2017 12:00 AM    VLDL, calculated 30 05/26/2017 12:00 AM    Triglyceride 152 05/26/2017 12:00 AM    Triglycerides (POC) 175 07/02/2014 11:15 AM       Review of Systems  Review of systems (12) negative, except noted above.       Past Medical History:   Diagnosis Date    Anemia NEC     Bronchitis     Fibroid     Hypertension     Positive hepatitis C antibody test 09/2017    [facility]    Psychiatric disorder     Schizophrenia ([address]      Past Surgical History:   Procedure Laterality Date    HX MYOMECTOMY       Social History     Social History    Marital status: SINGLE     Spouse name: N/A    Number of children: N/A    Years of education: N/A     Social History Main Topics    Smoking status: Former Smoker    Smokeless tobacco: Never Used    Alcohol use No    Drug use: No    Sexual activity: Not Currently     Other Topics Concern    None     Social History Narrative     Family History Problem Relation Age of Onset    Alcohol abuse Mother     Alcohol abuse Father      Current Outpatient Prescriptions   Medication Sig Dispense Refill    hydroCHLOROthiazide (HYDRODIURIL) 12.5 mg tablet TAKE 1 TAB BY MOUTH DAILY. 30 Tab 1    INVEGA SUSTENNA 78 mg/0.5 mL injection       ibuprofen (MOTRIN) 800 mg tablet TAKE 1 TABLET BY MOUTH EVERY EIGHT (8) HOURS AS NEEDED FOR PAIN. TAKE WITH FOOD. 60 Tab 5    esomeprazole magnesium (NEXIUM 24HR) 22.3 mg cpDR Take 1 Tab by mouth daily. 30 Tab 3     No Known Allergies    Objective:  Visit Vitals    /88 (BP 1 Location: Left arm, BP Patient Position: Sitting)    Pulse 94    Temp 98.2 °F (36.8 °C) (Oral)    Resp 20    Ht 4' 11\" (1.499 m)    Wt 171 lb 3.2 oz (77.7 kg)    LMP 04/10/2011    SpO2 99%    BMI 34.58 kg/m2     Physical Exam:   General appearance - alert, obese, pleasant lady w wig  Mental status - alert, oriented to person, place, and time  EYE-EOMI  Neck - supple,   Chest - symmetric air entry    Abdomen - obese  Ext-left knee:nml ROM, no crepitis RT knee: + edema superiorly, AYANA, no crepitis  Skin-Warm and dry. no hyperpigmentation, vitiligo, or suspicious lesions  Neuro -alert, oriented, normal speech, no focal findings or movement disorder noted        Results for orders placed or performed in visit on 89/35/79   METABOLIC PANEL, COMPREHENSIVE   Result Value Ref Range    Glucose 118 (H) 65 - 99 mg/dL    BUN 10 6 - 24 mg/dL    Creatinine 0.92 0.57 - 1.00 mg/dL    GFR est non-AA 70 >59 mL/min/1.73    GFR est AA 81 >59 mL/min/1.73    BUN/Creatinine ratio 11 9 - 23    Sodium 142 134 - 144 mmol/L    Potassium 4.2 3.5 - 5.2 mmol/L    Chloride 97 96 - 106 mmol/L    CO2 28 18 - 29 mmol/L    Calcium 9.9 8.7 - 10.2 mg/dL    Protein, total 7.7 6.0 - 8.5 g/dL    Albumin 4.5 3.5 - 5.5 g/dL    GLOBULIN, TOTAL 3.2 1.5 - 4.5 g/dL    A-G Ratio 1.4 1.2 - 2.2    Bilirubin, total <0.2 0.0 - 1.2 mg/dL    Alk.  phosphatase 73 39 - 117 IU/L    AST (SGOT) 25 0 - 40 IU/L    ALT (SGPT) 22 0 - 32 IU/L   LIPID PANEL   Result Value Ref Range    Cholesterol, total 233 (H) 100 - 199 mg/dL    Triglyceride 152 (H) 0 - 149 mg/dL    HDL Cholesterol 81 >39 mg/dL    VLDL, calculated 30 5 - 40 mg/dL    LDL, calculated 122 (H) 0 - 99 mg/dL   CBC W/O DIFF   Result Value Ref Range    WBC 4.3 3.4 - 10.8 x10E3/uL    RBC 4.62 3.77 - 5.28 x10E6/uL    HGB 13.1 11.1 - 15.9 g/dL    HCT 40.5 34.0 - 46.6 %    MCV 88 79 - 97 fL    MCH 28.4 26.6 - 33.0 pg    MCHC 32.3 31.5 - 35.7 g/dL    RDW 15.7 (H) 12.3 - 15.4 %    PLATELET 107 902 - 588 x10E3/uL   HEPATITIS C AB   Result Value Ref Range    Hep C Virus Ab 8.8 (H) 0.0 - 0.9 s/co ratio   CVD REPORT   Result Value Ref Range    INTERPRETATION Note    AMB POC HEMOGLOBIN (HGB)   Result Value Ref Range    Hemoglobin (POC) 10.5        Assessment/Plan:    ICD-10-CM ICD-9-CM    1. Sprain of right knee, unspecified ligament, initial encounter S83. 91XA 844.9    2. Mixed hyperlipidemia E78.2 272.2 LIPID PANEL      HEPATITIS C QT BY PCR WITH REFLEX GENOTYPE   3. Hepatitis C antibody positive in blood R76.8 795.79 LIPID PANEL      HEPATITIS C QT BY PCR WITH REFLEX GENOTYPE   4. Schizophrenia, unspecified type ([email or web address] F20.9 295.90      Orders Placed This Encounter    LIPID PANEL    HEPATITIS C QT BY PCR WITH REFLEX GENOTYPE     Recommend otc NSAID prn  Check lipids and hep c quant  F/u PCP routine/prn  Patient Instructions        Low-Fat Diet : Care Instructions  Your Care Instructions    Follow-up care is a key part of your treatment and safety. Be sure to make and go to all appointments, and call your doctor if you are having problems. It's also a good idea to know your test results and keep a list of the medicines you take. How can you care for yourself at home? · Eat many small meals and snacks each day instead of three large meals. · Choose lean meats. ¨ Eat no more than 5 to 6½ ounces of meat a day. ¨ Cut off all fat you can see.   ¨ Eat chicken and turkey without the skin. ¨ Many types of fish, such as salmon, lake trout, tuna, and herring, provide healthy omega-3 fat. But, avoid fish canned in oil, such as sardines in olive oil. ¨ Bake, broil, or grill meats, poultry, or fish instead of frying them in butter or fat. · Drink or eat nonfat or low-fat milk, yogurt, cheese, or other milk products each day. ¨ Read the labels on cheeses, and choose those with less than 5 grams of fat an ounce. ¨ Try fat-free sour cream, cream cheese, or yogurt. ¨ Avoid cream soups and cream sauces on pasta. ¨ Eat low-fat ice cream, frozen yogurt, or sorbet. Avoid regular ice cream.  · Eat whole-grain cereals, breads, crackers, rice, or pasta. Avoid high-fat foods such as croissants, scones, biscuits, waffles, doughnuts, muffins, granola, and high-fat breads. · Flavor your foods with herbs and spices (such as basil, tarragon, or mint), fat-free sauces, or lemon juice instead of butter. You can also use butter substitutes, fat-free mayonnaise, or fat-free dressing. · Try applesauce, prune puree, or mashed bananas to replace some or all of the fat when you bake. · Limit fats and oils, such as butter, margarine, mayonnaise, and salad dressing, to no more than 1 tablespoon a meal.  · Avoid high-fat foods, such as:  ¨ Chocolate, whole milk, ice cream, and processed cheese. ¨ Fried or buttered foods. ¨ Sausage, salami, and davis. ¨ Cinnamon rolls, cakes, pies, cookies, and other pastries. ¨ Prepared snack foods, such as potato chips, nut and granola bars, and mixed nuts. ¨ Coconut and avocado. · Learn how to read food labels for serving sizes and ingredients. Fast-food and convenience-food meals often have lots of fat. Where can you learn more? Go to http://agustin.info/. Enter Q785 in the search box to learn more about \"Low-Fat Diet for Gallbladder Disease: Care Instructions. \"  Current as of:  May 12, 2017  Content Version: 11.4  © 5533-7179 Healthwise, Incorporated. Care instructions adapted under license by [employer] (which disclaims liability or warranty for this information). If you have questions about a medical condition or this instruction, always ask your healthcare professional. [address]  any warranty or liability for your use of this information. Follow-up Disposition:  Return in about 3 months (around 4/22/2018) for f/u Ronna. I have reviewed with the patient details of the assessment and plan and all questions were answered. Relevent patient education was performed. The most recent lab findings were reviewed with the patient. An After Visit Summary was printed and given to the patient.

## 2018-01-22 NOTE — MR AVS SNAPSHOT
[address] [facility] [address] [place] [address] 
[phone] Patient: Herr Cough MRN: [id number] QSL:[birth year] Visit Information Date & Time Provider Department Dept. Phone Encounter #  
 1/22/2018 10:40 AM Ana Sandoval, [address] [id number] Follow-up Instructions Return in about 3 months (around 4/22/2018) for f/u Ronna. Upcoming Health Maintenance Date Due DTaP/Tdap/Td series (1 - Tdap) 3/1/1983 BREAST CANCER SCRN MAMMOGRAM 3/1/2012 FOBT Q 1 YEAR AGE 50-75 3/1/2012 PAP AKA CERVICAL CYTOLOGY 5/26/2020 Allergies as of 1/22/2018  Review Complete On: 1/22/2018 By: Bell Otero LPN No Known Allergies Current Immunizations  Never Reviewed Name Date Influenza Vaccine Intradermal PF 1/19/2015 Influenza Vaccine Split 10/18/2010 Not reviewed this visit You Were Diagnosed With   
  
 Codes Comments Mixed hyperlipidemia    -  Primary ICD-10-CM: A49.4 ICD-9-CM: 272.2 Hepatitis C antibody positive in blood     ICD-10-CM: R76.8 ICD-9-CM: 795.79 Schizophrenia, unspecified type ([facility].)     ICD-10-CM: F20.9 ICD-9-CM: 295.90 Vitals BP Pulse Temp Resp Height(growth percentile) Weight(growth percentile) 136/88 (BP 1 Location: Left arm, BP Patient Position: Sitting) 94 98.2 °F (36.8 °C) (Oral) 20 4' 11\" (1.499 m) 171 lb 3.2 oz (77.7 kg) LMP SpO2 BMI OB Status Smoking Status 04/10/2011 99% 34.58 kg/m2 Hysterectomy Former Smoker Vitals History BMI and BSA Data Body Mass Index Body Surface Area 34.58 kg/m 2 1.8 m 2 Preferred Pharmacy Pharmacy Name Phone [id number], [place] - [address] [phone] Your Updated Medication List  
  
   
This list is accurate as of: 1/22/18 11:40 AM.  Always use your most recent med list.  
  
  
  
  
 esomeprazole magnesium 22.3 mg Cpdr  
Commonly known as:  NexIUM 24HR Take 1 Tab by mouth daily. hydroCHLOROthiazide 12.5 mg tablet Commonly known as:  HYDRODIURIL  
TAKE 1 TAB BY MOUTH DAILY. ibuprofen 800 mg tablet Commonly known as:  MOTRIN  
TAKE 1 TABLET BY MOUTH EVERY EIGHT (8) HOURS AS NEEDED FOR PAIN. TAKE WITH FOOD. INVEGA SUSTENNA 78 mg/0.5 mL injection Generic drug:  Paliperidone Palmitate We Performed the Following HEPATITIS C QT BY PCR WITH REFLEX GENOTYPE [[id number] Custom] LIPID PANEL [32150 CPT(R)] Follow-up Instructions Return in about 3 months (around 4/22/2018) for f/u Ronna. Patient Instructions Low-Fat Diet : Care Instructions Your Care Instructions Follow-up care is a key part of your treatment and safety. Be sure to make and go to all appointments, and call your doctor if you are having problems. It's also a good idea to know your test results and keep a list of the medicines you take. How can you care for yourself at home? · Eat many small meals and snacks each day instead of three large meals. · Choose lean meats. ¨ Eat no more than 5 to 6½ ounces of meat a day. ¨ Cut off all fat you can see. ¨ Eat chicken and turkey without the skin. ¨ Many types of fish, such as salmon, lake trout, tuna, and herring, provide healthy omega-3 fat. But, avoid fish canned in oil, such as sardines in olive oil. ¨ Bake, broil, or grill meats, poultry, or fish instead of frying them in butter or fat. · Drink or eat nonfat or low-fat milk, yogurt, cheese, or other milk products each day. ¨ Read the labels on cheeses, and choose those with less than 5 grams of fat an ounce. ¨ Try fat-free sour cream, cream cheese, or yogurt. ¨ Avoid cream soups and cream sauces on pasta. ¨ Eat low-fat ice cream, frozen yogurt, or sorbet. Avoid regular ice cream. 
· Eat whole-grain cereals, breads, crackers, rice, or pasta.  Avoid high-fat foods such as croissants, scones, biscuits, waffles, doughnuts, muffins, granola, and high-fat breads. · Flavor your foods with herbs and spices (such as basil, tarragon, or mint), fat-free sauces, or lemon juice instead of butter. You can also use butter substitutes, fat-free mayonnaise, or fat-free dressing. · Try applesauce, prune puree, or mashed bananas to replace some or all of the fat when you bake. · Limit fats and oils, such as butter, margarine, mayonnaise, and salad dressing, to no more than 1 tablespoon a meal. 
· Avoid high-fat foods, such as: 
¨ Chocolate, whole milk, ice cream, and processed cheese. ¨ Fried or buttered foods. ¨ Sausage, salami, and davis. ¨ Cinnamon rolls, cakes, pies, cookies, and other pastries. ¨ Prepared snack foods, such as potato chips, nut and granola bars, and mixed nuts. ¨ Coconut and avocado. · Learn how to read food labels for serving sizes and ingredients. Fast-food and convenience-food meals often have lots of fat. Where can you learn more? Go to http://patrick-yeny.info/. Enter N958 in the search box to learn more about \"Low-Fat Diet for Gallbladder Disease: Care Instructions. \" Current as of: May 12, 2017 Content Version: 11.4 © 8249-9201 [employer]. Care instructions adapted under license by [employer] (which disclaims liability or warranty for this information). If you have questions about a medical condition or this instruction, always ask your healthcare professional. [place] [address] any warranty or liability for your use of this information. Introducing [facility]! Sami Hernandez introduces [employer] patient portal. Now you can access parts of your medical record, email your doctor's office, and request medication refills online. 1. In your internet browser, go to [email or web address] 2. Click on the First Time User? Click Here link in the Sign In box. You will see the New Member Sign Up page. 3. Enter your [employer] Access Code exactly as it appears below. You will not need to use this code after youve completed the sign-up process. If you do not sign up before the expiration date, you must request a new code. · [employer] Access Code: [email or web address] Expires: 4/22/2018 11:40 AM 
 
4. Enter the last four digits of your Social Security Number (xxxx) and Date of Birth (mm/dd/yyyy) as indicated and click Submit. You will be taken to the next sign-up page. 5. Create a [employer] ID. This will be your [employer] login ID and cannot be changed, so think of one that is secure and easy to remember. 6. Create a [employer] password. You can change your password at any time. 7. Enter your Password Reset Question and Answer. This can be used at a later time if you forget your password. 8. Enter your e-mail address. You will receive e-mail notification when new information is available in [address] 9. Click Sign Up. You can now view and download portions of your medical record. 10. Click the Download Summary menu link to download a portable copy of your medical information. If you have questions, please visit the Frequently Asked Questions section of the [employer] website. Remember, [employer] is NOT to be used for urgent needs. For medical emergencies, dial 911. Now available from your iPhone and Android! Please provide this summary of care documentation to your next provider. Your primary care clinician is listed as Vivian Cordoba. If you have any questions after today's visit, please call [phone].

## 2018-01-31 LAB
CHOLEST SERPL-MCNC: 208 MG/DL (ref 100–199)
HCV GENOTYPE: NORMAL
HCV RNA SERPL NAA+PROBE-ACNC: NORMAL IU/ML
HCV RNA SERPL NAA+PROBE-LOG IU: NORMAL LOG10 IU/ML
HDLC SERPL-MCNC: 66 MG/DL
INTERPRETATION, 910389: NORMAL
LDLC SERPL CALC-MCNC: 124 MG/DL (ref 0–99)
TEST INFORMATION: NORMAL
TRIGL SERPL-MCNC: 90 MG/DL (ref 0–149)
VLDLC SERPL CALC-MCNC: 18 MG/DL (ref 5–40)

## 2018-05-25 DIAGNOSIS — M25.562 CHRONIC PAIN OF LEFT KNEE: ICD-10-CM

## 2018-05-25 DIAGNOSIS — G89.29 CHRONIC PAIN OF LEFT KNEE: ICD-10-CM

## 2018-05-25 RX ORDER — IBUPROFEN 800 MG/1
TABLET ORAL
Qty: 60 TAB | Refills: 5 | Status: SHIPPED | OUTPATIENT
Start: 2018-05-25 | End: 2019-01-24 | Stop reason: SDUPTHER

## 2018-06-25 ENCOUNTER — OFFICE VISIT (OUTPATIENT)
Dept: INTERNAL MEDICINE CLINIC | Age: 58
End: 2018-06-25

## 2018-06-25 VITALS
DIASTOLIC BLOOD PRESSURE: 84 MMHG | OXYGEN SATURATION: 98 % | HEART RATE: 100 BPM | WEIGHT: 163.9 LBS | BODY MASS INDEX: 33.04 KG/M2 | RESPIRATION RATE: 18 BRPM | HEIGHT: 59 IN | TEMPERATURE: 98.7 F | SYSTOLIC BLOOD PRESSURE: 126 MMHG

## 2018-06-25 DIAGNOSIS — M25.461 SWELLING OF KNEE JOINT, RIGHT: Primary | ICD-10-CM

## 2018-06-25 DIAGNOSIS — R73.09 ELEVATED GLUCOSE: ICD-10-CM

## 2018-06-25 NOTE — MR AVS SNAPSHOT
[address] [facility] [address] [address] 
[phone] Patient: Dave Hernandez MRN: [id number] KSW:[birth year] Visit Information Date & Time Provider Department Dept. Phone Encounter #  
 6/25/2018 10:45 AM Alma Rodriguez, [address] [id number] Follow-up Instructions Return if symptoms worsen or fail to improve. Upcoming Health Maintenance Date Due DTaP/Tdap/Td series (1 - Tdap) 3/1/1983 BREAST CANCER SCRN MAMMOGRAM 3/1/2012 FOBT Q 1 YEAR AGE 50-75 3/1/2012 Influenza Age 5 to Adult 8/1/2018 PAP AKA CERVICAL CYTOLOGY 5/26/2020 Allergies as of 6/25/2018  Review Complete On: 6/25/2018 By: Alma Rodriguez MD  
 No Known Allergies Current Immunizations  Never Reviewed Name Date Influenza Vaccine Intradermal PF 1/19/2015 Influenza Vaccine Split 10/18/2010 Not reviewed this visit You Were Diagnosed With   
  
 Codes Comments Swelling of knee joint, right    -  Primary ICD-10-CM: M25.461 ICD-9-CM: 719.06 Elevated glucose     ICD-10-CM: R73.09 
ICD-9-CM: 790.29 Vitals BP Pulse Temp Resp Height(growth percentile) Weight(growth percentile) 126/84 (BP 1 Location: Left arm, BP Patient Position: Sitting) 100 98.7 °F (37.1 °C) (Oral) 18 4' 11\" (1.499 m) 163 lb 14.4 oz (74.3 kg) LMP SpO2 BMI OB Status Smoking Status 04/10/2011 98% 33.1 kg/m2 Hysterectomy Former Smoker Vitals History BMI and BSA Data Body Mass Index Body Surface Area  
 33.1 kg/m 2 1.76 m 2 Preferred Pharmacy Pharmacy Name Phone [id number]- [place], [place] - [address] [phone] Your Updated Medication List  
  
   
This list is accurate as of 6/25/18 11:07 AM.  Always use your most recent med list.  
  
  
  
  
 esomeprazole magnesium 22.3 mg Cpdr  
Commonly known as:  NexIUM 24HR Take 1 Tab by mouth daily. hydroCHLOROthiazide 12.5 mg tablet Commonly known as:  HYDRODIURIL  
TAKE 1 TAB BY MOUTH DAILY. ibuprofen 800 mg tablet Commonly known as:  MOTRIN  
TAKE 1 TABLET BY MOUTH EVERY EIGHT (8) HOURS AS NEEDED FOR PAIN. TAKE WITH FOOD. INVEGA SUSTENNA 78 mg/0.5 mL injection Generic drug:  Paliperidone Palmitate Leg Brace Misc Commonly known as:  ACE KNEE BRACE  
by Does Not Apply route. Rt knee ACE bandage Prescriptions Sent to Pharmacy Refills Leg Brace (ACE KNEE BRACE) misc 0 Sig: by Does Not Apply route. Rt knee ACE bandage Class: Normal  
 [facility] [id number] [facility] [address] Ph #: [phone] Route: Does Not Apply We Performed the Following HEMOGLOBIN A1C WITH EAG [56812 CPT(R)] METABOLIC PANEL, BASIC [51710 CPT(R)] REFERRAL TO ORTHOPEDICS [[id number] Custom] Follow-up Instructions Return if symptoms worsen or fail to improve. Referral Information Referral ID Referred By Referred To  
  
 [id number] Nani CLANCY MD   
   [address] [facility] [address] [facility], [address] Phone: [phone] Fax: [phone] Visits Status Start Date End Date 1 New Request 6/25/18 6/25/19 If your referral has a status of pending review or denied, additional information will be sent to support the outcome of this decision. Patient Instructions Knee Sprain: Care Instructions Your Care Instructions A knee sprain is one or more stretched, partly torn, or completely torn knee ligaments. Ligaments are bands of ropelike tissue that connect bone to bone and make the knee stable. The knee has four main ligaments. Knee sprains often happen because of a twisting or bending injury from sports such as skiing, basketball, soccer, or football.  The knee turns one way while the lower or upper leg goes another way. A sprain also can happen when the knee is hit from the side or the front. If a knee ligament is slightly stretched, you will probably need only home treatment. You may need a splint or brace (immobilizer) for a partly torn ligament. A complete tear may need surgery. A minor knee sprain may take up to 6 weeks to heal, while a severe sprain may take months. Follow-up care is a key part of your treatment and safety. Be sure to make and go to all appointments, and call your doctor if you are having problems. It's also a good idea to know your test results and keep a list of the medicines you take. How can you care for yourself at home? · Follow instructions about how much weight you can put on your leg and how to walk with crutches. · Prop up your leg on a pillow when you ice it or anytime you sit or lie down for the next 3 days. Try to keep it above the level of your heart. This will help reduce swelling. · Put ice or a cold pack on your knee for 10 to 20 minutes at a time. Try to do this every 1 to 2 hours for the next 3 days (when you are awake) or until the swelling goes down. Put a thin cloth between the ice and your skin. Do not get the splint wet. · If you have an elastic bandage, make sure it is snug but not so tight that your leg is numb, tingles, or swells below the bandage. You can loosen the bandage if it is too tight. · Your doctor may recommend a brace (immobilizer) to support your knee while it heals. Wear it as directed. · Ask your doctor if you can take an over-the-counter pain medicine, such as acetaminophen (Tylenol), ibuprofen (Advil, Motrin), or naproxen (Aleve). Be safe with medicines. Read and follow all instructions on the label. When should you call for help? Call 911 anytime you think you may need emergency care. For example, call if: 
? · You have sudden chest pain and shortness of breath, or you cough up blood. ?Call your doctor now or seek immediate medical care if: 
? · You have increased or severe pain. ? · You cannot move your toes or ankle. ? · Your foot is cool or pale or changes color. ? · You have tingling, weakness, or numbness in your foot or leg. ? · Your splint or brace feels too tight. ? · You are unable to straighten the knee, or the knee \"locks. \"  
? · You have signs of a blood clot in your leg, such as: 
¨ Pain in your calf, back of the knee, thigh, or groin. ¨ Redness and swelling in your leg. ? Watch closely for changes in your health, and be sure to contact your doctor if: 
? · Your pain is not getting better or is getting worse. Where can you learn more? Go to http://patrick-yeny.info/. Enter N406 in the search box to learn more about \"Knee Sprain: Care Instructions. \" Current as of: March 21, 2017 Content Version: 11.4 © 9561-8757 [facility]. Care instructions adapted under license by [employer] (which disclaims liability or warranty for this information). If you have questions about a medical condition or this instruction, always ask your healthcare professional. [place] [address] any warranty or liability for your use of this information. Introducing [address]! [facility] introduces [employer] patient portal. Now you can access parts of your medical record, email your doctor's office, and request medication refills online. 1. In your internet browser, go to [email or web address] 2. Click on the First Time User? Click Here link in the Sign In box. You will see the New Member Sign Up page. 3. Enter your [employer] Access Code exactly as it appears below. You will not need to use this code after youve completed the sign-up process. If you do not sign up before the expiration date, you must request a new code. · [employer] Access Code: [email or web address] Expires: 9/23/2018 10:36 AM 
 
 4. Enter the last four digits of your Social Security Number (xxxx) and Date of Birth (mm/dd/yyyy) as indicated and click Submit. You will be taken to the next sign-up page. 5. Create a [employer] ID. This will be your [employer] login ID and cannot be changed, so think of one that is secure and easy to remember. 6. Create a [employer] password. You can change your password at any time. 7. Enter your Password Reset Question and Answer. This can be used at a later time if you forget your password. 8. Enter your e-mail address. You will receive e-mail notification when new information is available in [address] 9. Click Sign Up. You can now view and download portions of your medical record. 10. Click the Download Summary menu link to download a portable copy of your medical information. If you have questions, please visit the Frequently Asked Questions section of the [employer] website. Remember, [employer] is NOT to be used for urgent needs. For medical emergencies, dial 911. Now available from your iPhone and Android! Please provide this summary of care documentation to your next provider. Your primary care clinician is listed as Trish Luna. If you have any questions after today's visit, please call [phone].

## 2018-06-25 NOTE — PROGRESS NOTES
1. Have you been to the ER, urgent care clinic since your last visit? Hospitalized since your last visit? No    2. Have you seen or consulted any other health care providers outside of the [address] [place] [address] since your last visit? Include any pap smears or colon screening.  No.  PHQ over the last two weeks 6/25/2018   PHQ Not Done -   Little interest or pleasure in doing things Not at all   Feeling down, depressed or hopeless Not at all   Total Score PHQ 2 0

## 2018-06-25 NOTE — PROGRESS NOTES
Renato Russo is a 66 y.o. female and presents with Knee Pain  . Subjective:    S/p fall 5 mths ago while walking down stairs. She twisted her knee and has had recurrent knee swelling and instability since. Pt has been using ibuprofen. The swelling improves, but returns. No recurrent trauma. No redness. No le edema      Review of Systems  Review of systems (12) negative, except noted above. Past Medical History:   Diagnosis Date    Anemia NEC     Bronchitis     Fibroid     Hypertension     Positive hepatitis C antibody test 09/2017    [facility]    Psychiatric disorder     Schizophrenia ([facility] [address]      Past Surgical History:   Procedure Laterality Date    HX MYOMECTOMY       Social History     Social History    Marital status: SINGLE     Spouse name: N/A    Number of children: N/A    Years of education: N/A     Social History Main Topics    Smoking status: Former Smoker    Smokeless tobacco: Never Used    Alcohol use No    Drug use: No    Sexual activity: Not Currently     Other Topics Concern    None     Social History Narrative     Family History   Problem Relation Age of Onset    Alcohol abuse Mother     Alcohol abuse Father      Current Outpatient Prescriptions   Medication Sig Dispense Refill    Leg Brace (ACE KNEE BRACE) misc by Does Not Apply route. Rt knee ACE bandage 1 Each 0    hydroCHLOROthiazide (HYDRODIURIL) 12.5 mg tablet TAKE 1 TAB BY MOUTH DAILY. 90 Tab 1    esomeprazole magnesium (NEXIUM 24HR) 22.3 mg cpDR Take 1 Tab by mouth daily. 30 Tab 3    INVEGA SUSTENNA 78 mg/0.5 mL injection       ibuprofen (MOTRIN) 800 mg tablet TAKE 1 TABLET BY MOUTH EVERY EIGHT (8) HOURS AS NEEDED FOR PAIN. TAKE WITH FOOD.  60 Tab 5     No Known Allergies    Objective:  Visit Vitals    /84 (BP 1 Location: Left arm, BP Patient Position: Sitting)    Pulse 100    Temp 98.7 °F (37.1 °C) (Oral)    Resp 18    Ht 4' 11\" (1.499 m)    Wt 163 lb 14.4 oz (74.3 kg)  LMP 04/10/2011    SpO2 98%    BMI 33.1 kg/m2     Physical Exam:   General appearance - alert, obese, pleasant lady w wig  Mental status - alert, oriented to person, place, and time  EYE-EOMI  Neck - supple,   Chest - symmetric air entry    Abdomen - obese  Ext-left knee:nml ROM, no crepitis RT knee: + edema + ballotable patella, AYANA, no crepitis  Skin-Warm and dry. no hyperpigmentation, vitiligo, or suspicious lesions  Neuro -alert, oriented, normal speech, no focal findings or movement disorder noted        Results for orders placed or performed in visit on 01/22/18   LIPID PANEL   Result Value Ref Range    Cholesterol, total 208 (H) 100 - 199 mg/dL    Triglyceride 90 0 - 149 mg/dL    HDL Cholesterol 66 >39 mg/dL    VLDL, calculated 18 5 - 40 mg/dL    LDL, calculated 124 (H) 0 - 99 mg/dL   HEPATITIS C QT BY PCR WITH REFLEX GENOTYPE   Result Value Ref Range    Hepatitis C Quantitation HCV Not Detected IU/mL    HCV log10 CANCELED log10 IU/mL    TEST INFORMATION Comment     HCV Genotype CANCELED    CVD REPORT   Result Value Ref Range    INTERPRETATION Note        Assessment/Plan:    ICD-10-CM ICD-9-CM    1. Swelling of knee joint, right M25.461 719.06 Leg Brace (ACE KNEE BRACE) misc      REFERRAL TO ORTHOPEDICS   2. Elevated glucose D58.78 694.83 METABOLIC PANEL, BASIC      HEMOGLOBIN A1C WITH EAG     Orders Placed This Encounter    METABOLIC PANEL, BASIC    HEMOGLOBIN A1C WITH EAG   [facility]     Referral Priority:   Routine     Referral Type:   Consultation     Referral Reason:   Specialty Services Required     Referred to Provider:   Edis Dupont MD    [facility] Br[facility]ACE KNEE BRACE) [facility]     Sig: by Does Not Apply route.  Rt knee ACE bandage     Dispense:  1 Each     Refill:  0     Will refer to ortho for eval ? cortisone injection  May need MRI to r/o meniscal injury  Check labs given elevated glucose in the past  Patient Instructions          Knee Sprain: Care Instructions  Your Care Instructions    A knee sprain is one or more stretched, partly torn, or completely torn knee ligaments. Ligaments are bands of ropelike tissue that connect bone to bone and make the knee stable. The knee has four main ligaments. Knee sprains often happen because of a twisting or bending injury from sports such as skiing, basketball, soccer, or football. The knee turns one way while the lower or upper leg goes another way. A sprain also can happen when the knee is hit from the side or the front. If a knee ligament is slightly stretched, you will probably need only home treatment. You may need a splint or brace (immobilizer) for a partly torn ligament. A complete tear may need surgery. A minor knee sprain may take up to 6 weeks to heal, while a severe sprain may take months. Follow-up care is a key part of your treatment and safety. Be sure to make and go to all appointments, and call your doctor if you are having problems. It's also a good idea to know your test results and keep a list of the medicines you take. How can you care for yourself at home? · Follow instructions about how much weight you can put on your leg and how to walk with crutches. · Prop up your leg on a pillow when you ice it or anytime you sit or lie down for the next 3 days. Try to keep it above the level of your heart. This will help reduce swelling. · Put ice or a cold pack on your knee for 10 to 20 minutes at a time. Try to do this every 1 to 2 hours for the next 3 days (when you are awake) or until the swelling goes down. Put a thin cloth between the ice and your skin. Do not get the splint wet. · If you have an elastic bandage, make sure it is snug but not so tight that your leg is numb, tingles, or swells below the bandage. You can loosen the bandage if it is too tight. · Your doctor may recommend a brace (immobilizer) to support your knee while it heals. Wear it as directed.   · Ask your doctor if you can take an over-the-counter pain medicine, such as acetaminophen (Tylenol), ibuprofen (Advil, Motrin), or naproxen (Aleve). Be safe with medicines. Read and follow all instructions on the label. When should you call for help? Call 911 anytime you think you may need emergency care. For example, call if:  ? · You have sudden chest pain and shortness of breath, or you cough up blood. ?Call your doctor now or seek immediate medical care if:  ? · You have increased or severe pain. ? · You cannot move your toes or ankle. ? · Your foot is cool or pale or changes color. ? · You have tingling, weakness, or numbness in your foot or leg. ? · Your splint or brace feels too tight. ? · You are unable to straighten the knee, or the knee \"locks. \"   ? · You have signs of a blood clot in your leg, such as:  ¨ Pain in your calf, back of the knee, thigh, or groin. ¨ Redness and swelling in your leg. ? Watch closely for changes in your health, and be sure to contact your doctor if:  ? · Your pain is not getting better or is getting worse. Where can you learn more? Go to http://patrick-yeny.info/. Enter N406 in the search box to learn more about \"Knee Sprain: Care Instructions. \"  Current as of: March 21, 2017  Content Version: 11.4  © 4248-2442 [employer]. Care instructions adapted under license by [employer] (which disclaims liability or warranty for this information). If you have questions about a medical condition or this instruction, always ask your healthcare professional. [place] [address] any warranty or liability for your use of this information. Follow-up Disposition:  Return if symptoms worsen or fail to improve. I have reviewed with the patient details of the assessment and plan and all questions were answered. Relevent patient education was performed. The most recent lab findings were reviewed with the patient.     An After Visit Summary was printed and given to the patient.

## 2018-06-25 NOTE — PATIENT INSTRUCTIONS
Knee Sprain: Care Instructions  Your Care Instructions    A knee sprain is one or more stretched, partly torn, or completely torn knee ligaments. Ligaments are bands of ropelike tissue that connect bone to bone and make the knee stable. The knee has four main ligaments. Knee sprains often happen because of a twisting or bending injury from sports such as skiing, basketball, soccer, or football. The knee turns one way while the lower or upper leg goes another way. A sprain also can happen when the knee is hit from the side or the front. If a knee ligament is slightly stretched, you will probably need only home treatment. You may need a splint or brace (immobilizer) for a partly torn ligament. A complete tear may need surgery. A minor knee sprain may take up to 6 weeks to heal, while a severe sprain may take months. Follow-up care is a key part of your treatment and safety. Be sure to make and go to all appointments, and call your doctor if you are having problems. It's also a good idea to know your test results and keep a list of the medicines you take. How can you care for yourself at home? · Follow instructions about how much weight you can put on your leg and how to walk with crutches. · Prop up your leg on a pillow when you ice it or anytime you sit or lie down for the next 3 days. Try to keep it above the level of your heart. This will help reduce swelling. · Put ice or a cold pack on your knee for 10 to 20 minutes at a time. Try to do this every 1 to 2 hours for the next 3 days (when you are awake) or until the swelling goes down. Put a thin cloth between the ice and your skin. Do not get the splint wet. · If you have an elastic bandage, make sure it is snug but not so tight that your leg is numb, tingles, or swells below the bandage. You can loosen the bandage if it is too tight. · Your doctor may recommend a brace (immobilizer) to support your knee while it heals. Wear it as directed.   · Ask your doctor if you can take an over-the-counter pain medicine, such as acetaminophen (Tylenol), ibuprofen (Advil, Motrin), or naproxen (Aleve). Be safe with medicines. Read and follow all instructions on the label. When should you call for help? Call 911 anytime you think you may need emergency care. For example, call if:  ? · You have sudden chest pain and shortness of breath, or you cough up blood. ?Call your doctor now or seek immediate medical care if:  ? · You have increased or severe pain. ? · You cannot move your toes or ankle. ? · Your foot is cool or pale or changes color. ? · You have tingling, weakness, or numbness in your foot or leg. ? · Your splint or brace feels too tight. ? · You are unable to straighten the knee, or the knee \"locks. \"   ? · You have signs of a blood clot in your leg, such as:  ¨ Pain in your calf, back of the knee, thigh, or groin. ¨ Redness and swelling in your leg. ? Watch closely for changes in your health, and be sure to contact your doctor if:  ? · Your pain is not getting better or is getting worse. Where can you learn more? Go to http://patrick-yeny.info/. Enter N406 in the search box to learn more about \"Knee Sprain: Care Instructions. \"  Current as of: March 21, 2017  Content Version: 11.4  © 5340-7853 [employer]. Care instructions adapted under license by [employer] (which disclaims liability or warranty for this information). If you have questions about a medical condition or this instruction, always ask your healthcare professional. [place] [address] any warranty or liability for your use of this information.

## 2018-06-26 PROBLEM — R73.03 PREDIABETES: Status: ACTIVE | Noted: 2018-06-26

## 2018-06-26 LAB
BUN SERPL-MCNC: 11 MG/DL (ref 6–24)
BUN/CREAT SERPL: 12 (ref 9–23)
CALCIUM SERPL-MCNC: 9.6 MG/DL (ref 8.7–10.2)
CHLORIDE SERPL-SCNC: 98 MMOL/L (ref 96–106)
CO2 SERPL-SCNC: 26 MMOL/L (ref 20–29)
CREAT SERPL-MCNC: 0.92 MG/DL (ref 0.57–1)
EST. AVERAGE GLUCOSE BLD GHB EST-MCNC: 120 MG/DL
GFR SERPLBLD CREATININE-BSD FMLA CKD-EPI: 70 ML/MIN/1.73
GFR SERPLBLD CREATININE-BSD FMLA CKD-EPI: 80 ML/MIN/1.73
GLUCOSE SERPL-MCNC: 129 MG/DL (ref 65–99)
HBA1C MFR BLD: 5.8 % (ref 4.8–5.6)
POTASSIUM SERPL-SCNC: 3.6 MMOL/L (ref 3.5–5.2)
SODIUM SERPL-SCNC: 141 MMOL/L (ref 134–144)

## 2018-07-13 DIAGNOSIS — I10 ESSENTIAL HYPERTENSION: ICD-10-CM

## 2018-07-13 RX ORDER — HYDROCHLOROTHIAZIDE 12.5 MG/1
TABLET ORAL
Qty: 90 TAB | Refills: 1 | Status: SHIPPED | OUTPATIENT
Start: 2018-07-13 | End: 2019-01-20 | Stop reason: SDUPTHER

## 2018-07-20 ENCOUNTER — TELEPHONE (OUTPATIENT)
Dept: INTERNAL MEDICINE CLINIC | Age: 58
End: 2018-07-20

## 2018-07-20 NOTE — TELEPHONE ENCOUNTER
Called and spoke w/ pt about currently being due to have a Mammogram scheduled. Pt stated that she will be in the office for an appt. on 07/24/18 and would like to discuss scheduling options for screening during visit.

## 2018-07-25 ENCOUNTER — OFFICE VISIT (OUTPATIENT)
Dept: INTERNAL MEDICINE CLINIC | Age: 58
End: 2018-07-25

## 2018-07-25 VITALS
BODY MASS INDEX: 32.01 KG/M2 | HEIGHT: 59 IN | RESPIRATION RATE: 18 BRPM | TEMPERATURE: 98.6 F | WEIGHT: 158.8 LBS | HEART RATE: 82 BPM | SYSTOLIC BLOOD PRESSURE: 120 MMHG | OXYGEN SATURATION: 97 % | DIASTOLIC BLOOD PRESSURE: 83 MMHG

## 2018-07-25 DIAGNOSIS — R73.03 PREDIABETES: ICD-10-CM

## 2018-07-25 DIAGNOSIS — R21 SKIN RASH: Primary | ICD-10-CM

## 2018-07-25 DIAGNOSIS — Z12.39 BREAST CANCER SCREENING: ICD-10-CM

## 2018-07-25 RX ORDER — TRIAMCINOLONE ACETONIDE 1 MG/G
OINTMENT TOPICAL 2 TIMES DAILY
Qty: 30 G | Refills: 0 | Status: SHIPPED | OUTPATIENT
Start: 2018-07-25

## 2018-07-25 NOTE — PROGRESS NOTES
Angela Monaco is a 66 y.o. female and presents with Rash (arm)  . Subjective:    Pt comes-in for itchy rash on her left forearm which developed the day after cutting her lawn. Pt did no weeding and was not exposed to poison ivy/oak  Pt has not tried anything for the rash. There is no spreading of rash. No redness/weeping nor pain. No similar rashes in the past      Review of Systems. .  Constitutional: negative for fevers, chills, anorexia and weight loss  Respiratory:  negative for cough, hemoptysis, dyspnea,wheezing  CV:   negative for chest pain, palpitations, lower extremity edema  GI:   negative for nausea, vomiting, diarrhea, abdominal pain,melena  Musculoskel: negative for myalgias, arthralgias, back pain, muscle weakness, joint pain  Neurological:  negative for headaches, dizziness, vertigo, memory problems and gait   Behavl/Psych: negative for feelings of anxiety, depression, mood changes    Past Medical History:   Diagnosis Date    Anemia NEC     Bronchitis     Fibroid     Hypertension     Positive hepatitis C antibody test 09/2017    [facility]    Psychiatric disorder     Schizophrenia ([facility] [address]      Past Surgical History:   Procedure Laterality Date    HX MYOMECTOMY       Social History     Social History    Marital status: SINGLE     Spouse name: N/A    Number of children: N/A    Years of education: N/A     Social History Main Topics    Smoking status: Former Smoker    Smokeless tobacco: Never Used    Alcohol use No    Drug use: No    Sexual activity: Not Currently     Other Topics Concern    None     Social History Narrative     Family History   Problem Relation Age of Onset    Alcohol abuse Mother     Alcohol abuse Father      Current Outpatient Prescriptions   Medication Sig Dispense Refill    triamcinolone acetonide (KENALOG) 0.1 % ointment Apply  to affected area two (2) times a day.  use thin layer 30 g 0    hydroCHLOROthiazide (HYDRODIURIL) 12.5 mg tablet TAKE 1 TAB BY MOUTH DAILY. 90 Tab 1    ibuprofen (MOTRIN) 800 mg tablet TAKE 1 TABLET BY MOUTH EVERY EIGHT (8) HOURS AS NEEDED FOR PAIN. TAKE WITH FOOD. 60 Tab 5    INVEGA SUSTENNA 78 mg/0.5 mL injection       Leg Brace (ACE KNEE BRACE) misc by Does Not Apply route. Rt knee ACE bandage 1 Each 0    esomeprazole magnesium (NEXIUM 24HR) 22.3 mg cpDR Take 1 Tab by mouth daily. 30 Tab 3     No Known Allergies    Objective:  Visit Vitals    /83 (BP 1 Location: Left arm, BP Patient Position: Sitting)    Pulse 82    Temp 98.6 °F (37 °C) (Oral)    Resp 18    Ht 4' 11\" (1.499 m)    Wt 158 lb 12.8 oz (72 kg)    LMP 04/10/2011    SpO2 97%    BMI 32.07 kg/m2     Physical Exam:   General appearance - alert, well appearing, and in no distress smells of tobacco  Mental status - alert, oriented to person, place, and time  EYE-EOMI  Neck - supple,   Chest - symmetric air entry    Abdomen - obese  Ext-no pedal edema, no clubbing or cyanosis  Skin-snadpaper/raised rash lft lateral forearm. Irreg bordrs , no erythema/induration nor warmth ~[id number]  Neuro -alert, oriented, normal speech, no focal findings or movement disorder noted        Results for orders placed or performed in visit on 41/77/62   METABOLIC PANEL, BASIC   Result Value Ref Range    Glucose 129 (H) 65 - 99 mg/dL    BUN 11 6 - 24 mg/dL    Creatinine 0.92 0.57 - 1.00 mg/dL    GFR est non-AA 70 >59 mL/min/1.73    GFR est AA 80 >59 mL/min/1.73    BUN/Creatinine ratio 12 9 - 23    Sodium 141 134 - 144 mmol/L    Potassium 3.6 3.5 - 5.2 mmol/L    Chloride 98 96 - 106 mmol/L    CO2 26 20 - 29 mmol/L    Calcium 9.6 8.7 - 10.2 mg/dL   HEMOGLOBIN A1C WITH EAG   Result Value Ref Range    Hemoglobin A1c 5.8 (H) 4.8 - 5.6 %    Estimated average glucose 120 mg/dL       Assessment/Plan:    ICD-10-CM ICD-9-CM    1. Skin rash R21 782.1 triamcinolone acetonide (KENALOG) 0.1 % ointment   2. Breast cancer screening Z12.31 V76.10 DAYNA MAMMO BI SCREENING INCL CAD   3. Prediabetes R73.03 790.29      Orders Placed This Encounter    DAYNA MAMMO BI SCREENING INCL CAD     Standing Status:   Future     Standing Expiration Date:   8/24/2019     Order Specific Question:   Reason for Exam     Answer:   screening    triamcinolone acetonide (KENALOG) 0.1 % ointment     Sig: Apply  to affected area two (2) times a day. use thin layer     Dispense:  30 g     Refill:  0   1. Skin rash  dermatitis  - triamcinolone acetonide (KENALOG) 0.1 % ointment; Apply  to affected area two (2) times a day. use thin layer  Dispense: 30 g; Refill: 0    2. Breast cancer screening    - DAYNA MAMMO BI SCREENING INCL CAD; Future    3. Prediabetes  Pt info re:diabetic diet given to pt    Patient Instructions        Learning About Diabetes Food Guidelines  Your Care Instructions    Meal planning is important to manage diabetes. It helps keep your blood sugar at a target level (which you set with your doctor). You don't have to eat special foods. You can eat what your family eats, including sweets once in a while. But you do have to pay attention to how often you eat and how much you eat of certain foods. You may want to work with a dietitian or a certified diabetes educator (CDE) to help you plan meals and snacks. A dietitian or CDE can also help you lose weight if that is one of your goals. What should you know about eating carbs? Managing the amount of carbohydrate (carbs) you eat is an important part of healthy meals when you have diabetes. Carbohydrate is found in many foods. · Learn which foods have carbs. And learn the amounts of carbs in different foods. ¨ Bread, cereal, pasta, and rice have about 15 grams of carbs in a serving. A serving is 1 slice of bread (1 ounce), ½ cup of cooked cereal, or 1/3 cup of cooked pasta or rice. ¨ Fruits have 15 grams of carbs in a serving.  A serving is 1 small fresh fruit, such as an apple or orange; ½ of a banana; ½ cup of cooked or canned fruit; ½ cup of fruit juice; 1 cup of melon or raspberries; or 2 tablespoons of dried fruit. ¨ Milk and no-sugar-added yogurt have 15 grams of carbs in a serving. A serving is 1 cup of milk or 2/3 cup of no-sugar-added yogurt. ¨ Starchy vegetables have 15 grams of carbs in a serving. A serving is ½ cup of mashed potatoes or sweet potato; 1 cup winter squash; ½ of a small baked potato; ½ cup of cooked beans; or ½ cup cooked corn or green peas. · Learn how much carbs to eat each day and at each meal. A dietitian or CDE can teach you how to keep track of the amount of carbs you eat. This is called carbohydrate counting. · If you are not sure how to count carbohydrate grams, use the Plate Method to plan meals. It is a good, quick way to make sure that you have a balanced meal. It also helps you spread carbs throughout the day. ¨ Divide your plate by types of foods. Put non-starchy vegetables on half the plate, meat or other protein food on one-quarter of the plate, and a grain or starchy vegetable in the final quarter of the plate. To this you can add a small piece of fruit and 1 cup of milk or yogurt, depending on how many carbs you are supposed to eat at a meal.  · Try to eat about the same amount of carbs at each meal. Do not \"save up\" your daily allowance of carbs to eat at one meal.  · Proteins have very little or no carbs per serving. Examples of proteins are beef, chicken, turkey, fish, eggs, tofu, cheese, cottage cheese, and peanut butter. A serving size of meat is 3 ounces, which is about the size of a deck of cards. Examples of meat substitute serving sizes (equal to 1 ounce of meat) are 1/4 cup of cottage cheese, 1 egg, 1 tablespoon of peanut butter, and ½ cup of tofu. How can you eat out and still eat healthy? · Learn to estimate the serving sizes of foods that have carbohydrate. If you measure food at home, it will be easier to estimate the amount in a serving of restaurant food.   · If the meal you order has too much carbohydrate (such as potatoes, corn, or baked beans), ask to have a low-carbohydrate food instead. Ask for a salad or green vegetables. · If you use insulin, check your blood sugar before and after eating out to help you plan how much to eat in the future. · If you eat more carbohydrate at a meal than you had planned, take a walk or do other exercise. This will help lower your blood sugar. What else should you know? · Limit saturated fat, such as the fat from meat and dairy products. This is a healthy choice because people who have diabetes are at higher risk of heart disease. So choose lean cuts of meat and nonfat or low-fat dairy products. Use olive or canola oil instead of butter or shortening when cooking. · Don't skip meals. Your blood sugar may drop too low if you skip meals and take insulin or certain medicines for diabetes. · Check with your doctor before you drink alcohol. Alcohol can cause your blood sugar to drop too low. Alcohol can also cause a bad reaction if you take certain diabetes medicines. Follow-up care is a key part of your treatment and safety. Be sure to make and go to all appointments, and call your doctor if you are having problems. It's also a good idea to know your test results and keep a list of the medicines you take. Where can you learn more? Go to http://patrick-yeny.info/. Enter U144 in the search box to learn more about \"Learning About Diabetes Food Guidelines. \"  Current as of: December 7, 2017  Content Version: 11.7  © 6474-6626 [employer]. Care instructions adapted under license by [employer] (which disclaims liability or warranty for this information). If you have questions about a medical condition or this instruction, always ask your healthcare professional. [place] [address] any warranty or liability for your use of this information.        Follow-up Disposition:  Return pt has appt kenroy Peck in Oct.      I have reviewed with the patient details of the assessment and plan and all questions were answered. Relevent patient education was performed. The most recent lab findings were reviewed with the patient. An After Visit Summary was printed and given to the patient.

## 2018-07-25 NOTE — MR AVS SNAPSHOT
Obdulia Walter 
 
 
 [place] [address] [facility] [address] 
[phone] Patient: Frank Skaggs MRN: [id number] GAV:[birth year] Visit Information Date & Time Provider Department Dept. Phone Encounter #  
 7/25/2018 11:30 AM Blanco Lawrence Alexmagi Jeysonnithya. [id number] Follow-up Instructions Return pt has appt kenroy Lewis in Oct.  
  
Your Appointments 7/25/2018 11:30 AM  
Any with Willie Martines MD  
[address] [facility]) Appt Note: rash and blood sugar; rash and blood sugar; rash and blood sugar; appt confirmed [place] [address] [facility] [address]  
[phone]  
  
   
 [place] [facility]  
  
    
 10/15/2018 11:20 AM  
ROUTINE CARE with Robe Marrero NP  
[facility]) Appt Note: chol, bp and hernia repair [address] [facility] [address]  
[phone]  
  
   
 [address] [facility] Upcoming Health Maintenance Date Due DTaP/Tdap/Td series (1 - Tdap) 3/1/1983 BREAST CANCER SCRN MAMMOGRAM 3/1/2012 FOBT Q 1 YEAR AGE 50-75 3/1/2012 Influenza Age 5 to Adult 8/1/2018 PAP AKA CERVICAL CYTOLOGY 5/26/2020 Allergies as of 7/25/2018  Review Complete On: 7/25/2018 By: Charity Galvan LPN No Known Allergies Current Immunizations  Never Reviewed Name Date Influenza Vaccine Intradermal PF 1/19/2015 Influenza Vaccine Split 10/18/2010 Not reviewed this visit You Were Diagnosed With   
  
 Codes Comments Skin rash    -  Primary ICD-10-CM: R21 
ICD-9-CM: 782.1 Breast cancer screening     ICD-10-CM: Z12.31 
ICD-9-CM: V76.10 Prediabetes     ICD-10-CM: R73.03 
ICD-9-CM: 790.29 Vitals BP Pulse Temp Resp Height(growth percentile) Weight(growth percentile) 120/83 (BP 1 Location: Left arm, BP Patient Position: Sitting) 82 98.6 °F (37 °C) (Oral) 18 4' 11\" (1.499 m) 158 lb 12.8 oz (72 kg) LMP SpO2 BMI OB Status Smoking Status 04/10/2011 97% 32.07 kg/m2 Hysterectomy Former Smoker Vitals History BMI and BSA Data Body Mass Index Body Surface Area 32.07 kg/m 2 1.73 m 2 Preferred Pharmacy Pharmacy Name Phone [id number]- [place], [place] - [address] [phone] Your Updated Medication List  
  
   
This list is accurate as of 7/25/18 11:21 AM.  Always use your most recent med list.  
  
  
  
  
 esomeprazole magnesium 22.3 mg Cpdr  
Commonly known as:  NexIUM 24HR Take 1 Tab by mouth daily. hydroCHLOROthiazide 12.5 mg tablet Commonly known as:  HYDRODIURIL  
TAKE 1 TAB BY MOUTH DAILY. ibuprofen 800 mg tablet Commonly known as:  MOTRIN  
TAKE 1 TABLET BY MOUTH EVERY EIGHT (8) HOURS AS NEEDED FOR PAIN. TAKE WITH FOOD. INVEGA SUSTENNA 78 mg/0.5 mL injection Generic drug:  Paliperidone Palmitate Leg Brace Misc Commonly known as:  ACE KNEE BRACE  
by Does Not Apply route. Rt knee ACE bandage  
  
 triamcinolone acetonide 0.1 % ointment Commonly known as:  KENALOG Apply  to affected area two (2) times a day. use thin layer Prescriptions Sent to Pharmacy Refills  
 triamcinolone acetonide (KENALOG) 0.1 % ointment 0 Sig: Apply  to affected area two (2) times a day. use thin layer Class: Normal  
 Pharmacy: [facility] [id number] [facility] [address] Ph #: [phone] Route: Topical  
  
Follow-up Instructions Return pt has appt kenroy Holliday in Oct.  
  
To-Do List   
 07/25/2018 Imaging:  DAYNA MAMMO BI SCREENING INCL CAD Patient Instructions Learning About Diabetes Food Guidelines Your Care Instructions Meal planning is important to manage diabetes. It helps keep your blood sugar at a target level (which you set with your doctor). You don't have to eat special foods. You can eat what your family eats, including sweets once in a while. But you do have to pay attention to how often you eat and how much you eat of certain foods. You may want to work with a dietitian or a certified diabetes educator (CDE) to help you plan meals and snacks. A dietitian or CDE can also help you lose weight if that is one of your goals. What should you know about eating carbs? Managing the amount of carbohydrate (carbs) you eat is an important part of healthy meals when you have diabetes. Carbohydrate is found in many foods. · Learn which foods have carbs. And learn the amounts of carbs in different foods. ¨ Bread, cereal, pasta, and rice have about 15 grams of carbs in a serving. A serving is 1 slice of bread (1 ounce), ½ cup of cooked cereal, or 1/3 cup of cooked pasta or rice. ¨ Fruits have 15 grams of carbs in a serving. A serving is 1 small fresh fruit, such as an apple or orange; ½ of a banana; ½ cup of cooked or canned fruit; ½ cup of fruit juice; 1 cup of melon or raspberries; or 2 tablespoons of dried fruit. ¨ Milk and no-sugar-added yogurt have 15 grams of carbs in a serving. A serving is 1 cup of milk or 2/3 cup of no-sugar-added yogurt. ¨ Starchy vegetables have 15 grams of carbs in a serving. A serving is ½ cup of mashed potatoes or sweet potato; 1 cup winter squash; ½ of a small baked potato; ½ cup of cooked beans; or ½ cup cooked corn or green peas. · Learn how much carbs to eat each day and at each meal. A dietitian or CDE can teach you how to keep track of the amount of carbs you eat. This is called carbohydrate counting. · If you are not sure how to count carbohydrate grams, use the Plate Method to plan meals.  It is a good, quick way to make sure that you have a balanced meal. It also helps you spread carbs throughout the day. ¨ Divide your plate by types of foods. Put non-starchy vegetables on half the plate, meat or other protein food on one-quarter of the plate, and a grain or starchy vegetable in the final quarter of the plate. To this you can add a small piece of fruit and 1 cup of milk or yogurt, depending on how many carbs you are supposed to eat at a meal. 
· Try to eat about the same amount of carbs at each meal. Do not \"save up\" your daily allowance of carbs to eat at one meal. 
· Proteins have very little or no carbs per serving. Examples of proteins are beef, chicken, turkey, fish, eggs, tofu, cheese, cottage cheese, and peanut butter. A serving size of meat is 3 ounces, which is about the size of a deck of cards. Examples of meat substitute serving sizes (equal to 1 ounce of meat) are 1/4 cup of cottage cheese, 1 egg, 1 tablespoon of peanut butter, and ½ cup of tofu. How can you eat out and still eat healthy? · Learn to estimate the serving sizes of foods that have carbohydrate. If you measure food at home, it will be easier to estimate the amount in a serving of restaurant food. · If the meal you order has too much carbohydrate (such as potatoes, corn, or baked beans), ask to have a low-carbohydrate food instead. Ask for a salad or green vegetables. · If you use insulin, check your blood sugar before and after eating out to help you plan how much to eat in the future. · If you eat more carbohydrate at a meal than you had planned, take a walk or do other exercise. This will help lower your blood sugar. What else should you know? · Limit saturated fat, such as the fat from meat and dairy products. This is a healthy choice because people who have diabetes are at higher risk of heart disease. So choose lean cuts of meat and nonfat or low-fat dairy products. Use olive or canola oil instead of butter or shortening when cooking. · Don't skip meals. Your blood sugar may drop too low if you skip meals and take insulin or certain medicines for diabetes. · Check with your doctor before you drink alcohol. Alcohol can cause your blood sugar to drop too low. Alcohol can also cause a bad reaction if you take certain diabetes medicines. Follow-up care is a key part of your treatment and safety. Be sure to make and go to all appointments, and call your doctor if you are having problems. It's also a good idea to know your test results and keep a list of the medicines you take. Where can you learn more? Go to http://patrick-yeny.info/. Enter B958 in the search box to learn more about \"Learning About Diabetes Food Guidelines. \" Current as of: December 7, 2017 Content Version: 11.7 © 0038-1771 [employer]. Care instructions adapted under license by [employer] (which disclaims liability or warranty for this information). If you have questions about a medical condition or this instruction, always ask your healthcare professional. [address] any warranty or liability for your use of this information. Please provide this summary of care documentation to your next provider. Your primary care clinician is listed as Ankush Rivera. If you have any questions after today's visit, please call [phone].

## 2018-07-25 NOTE — PROGRESS NOTES
Chief Complaint   Patient presents with    Rash     arm     1. Have you been to the ER, urgent care clinic since your last visit? Hospitalized since your last visit? No    2. Have you seen or consulted any other health care providers outside of the [facility] since your last visit? Include any pap smears or colon screening.  No    Pt states she was cutting grass 2 days ago and then she notice her arm was \"irritated\"

## 2018-07-25 NOTE — PATIENT INSTRUCTIONS

## 2018-09-05 ENCOUNTER — HOSPITAL ENCOUNTER (OUTPATIENT)
Dept: MAMMOGRAPHY | Age: 58
Discharge: HOME | End: 2018-09-05
Payer: MEDICAID

## 2018-09-05 DIAGNOSIS — Z12.31 VISIT FOR SCREENING MAMMOGRAM: ICD-10-CM

## 2018-09-05 PROCEDURE — 77067 SCR MAMMO BI INCL CAD: CPT

## 2019-01-20 DIAGNOSIS — I10 ESSENTIAL HYPERTENSION: ICD-10-CM

## 2019-01-21 RX ORDER — HYDROCHLOROTHIAZIDE 12.5 MG/1
TABLET ORAL
Qty: 90 TAB | Refills: 1 | Status: SHIPPED | OUTPATIENT
Start: 2019-01-21 | End: 2019-07-18 | Stop reason: SDUPTHER

## 2019-01-24 DIAGNOSIS — G89.29 CHRONIC PAIN OF LEFT KNEE: ICD-10-CM

## 2019-01-24 DIAGNOSIS — M25.562 CHRONIC PAIN OF LEFT KNEE: ICD-10-CM

## 2019-01-24 RX ORDER — IBUPROFEN 800 MG/1
TABLET ORAL
Qty: 60 TAB | Refills: 5 | Status: SHIPPED | OUTPATIENT
Start: 2019-01-24 | End: 2019-05-15 | Stop reason: SDUPTHER

## 2019-05-15 DIAGNOSIS — G89.29 CHRONIC PAIN OF LEFT KNEE: ICD-10-CM

## 2019-05-15 DIAGNOSIS — M25.562 CHRONIC PAIN OF LEFT KNEE: ICD-10-CM

## 2019-05-16 RX ORDER — IBUPROFEN 800 MG/1
TABLET ORAL
Qty: 60 TAB | Refills: 5 | Status: SHIPPED | OUTPATIENT
Start: 2019-05-16 | End: 2019-11-17 | Stop reason: SDUPTHER

## 2019-08-06 ENCOUNTER — OFFICE VISIT (OUTPATIENT)
Dept: INTERNAL MEDICINE CLINIC | Age: 59
End: 2019-08-06

## 2019-08-06 VITALS
TEMPERATURE: 98.3 F | WEIGHT: 158 LBS | HEART RATE: 82 BPM | RESPIRATION RATE: 19 BRPM | DIASTOLIC BLOOD PRESSURE: 78 MMHG | OXYGEN SATURATION: 94 % | BODY MASS INDEX: 31.85 KG/M2 | SYSTOLIC BLOOD PRESSURE: 117 MMHG | HEIGHT: 59 IN

## 2019-08-06 DIAGNOSIS — Z12.11 COLON CANCER SCREENING: ICD-10-CM

## 2019-08-06 DIAGNOSIS — R73.03 PREDIABETES: Primary | ICD-10-CM

## 2019-08-06 DIAGNOSIS — Z11.3 SCREEN FOR STD (SEXUALLY TRANSMITTED DISEASE): ICD-10-CM

## 2019-08-06 NOTE — PROGRESS NOTES
Jaylin Ring is a 64 y.o. female and presents with Labs (Pt want test for HIV)  . Subjective:  Pt comes-in to request an HIV test. She is not currently sexually active. Hypertension Review:  The patient has essential hypertension  Diet and Lifestyle: generally follows a  low sodium diet, exercises sporadically  Home BP Monitoring: is not measured at home. Pertinent ROS: taking medications as instructed, no medication side effects noted, no TIA's, no chest pain on exertion, no dyspnea on exertion, no swelling of ankles. BP Readings from Last 3 Encounters:   08/06/19 117/78   07/25/18 120/83   06/25/18 126/84       Schizophrenia-noted     Hep C-recent VL NEGATIVE    Pts chart review showed she has NOT done her colonoscopy    Review of Systems  Constitutional: negative for fevers, chills, anorexia and weight loss  Eyes:   negative for visual disturbance and irritation  ENT:   negative for tinnitus,sore throat,nasal congestion,ear pains. hoarseness  Respiratory:  negative for cough, hemoptysis, dyspnea,wheezing  CV:   negative for chest pain, palpitations, lower extremity edema  GI:   negative for nausea, vomiting, diarrhea, abdominal pain,melena  Musculoskel: negative for myalgias, arthralgias, back pain, muscle weakness, joint pain  Neurological:  negative for headaches, dizziness, vertigo, memory problems and gait   Behavl/Psych: negative for feelings of anxiety, depression, mood changes    Past Medical History:   Diagnosis Date    Anemia NEC     Bronchitis     Fibroid     Hypertension     Positive hepatitis C antibody test 09/2017    follow [facility]    Psychiatric disorder     Schizophrenia [facility])      Past Surgical History:   Procedure Laterality Date    HX MYOMECTOMY       Social History     Socioeconomic History    Marital status: SINGLE     Spouse name: Not on file    Number of children: Not on file    Years of education: Not on file    Highest education level: Not on file   Tobacco Use    Smoking status: Former Smoker    Smokeless tobacco: Never Used   Substance and Sexual Activity    Alcohol use: No     Alcohol/week: 0.0 standard drinks    Drug use: No    Sexual activity: Not Currently     Family History   Problem Relation Age of Onset    Alcohol abuse Mother     Alcohol abuse Father      Current Outpatient Medications   Medication Sig Dispense Refill    hydroCHLOROthiazide (HYDRODIURIL) 12.5 mg tablet TAKE 1 TAB BY MOUTH DAILY. 30 Tab 1    ibuprofen (MOTRIN) 800 mg tablet TAKE 1 TABLET BY MOUTH EVERY EIGHT (8) HOURS AS NEEDED FOR PAIN. TAKE WITH FOOD. 60 Tab 5    INVEGA SUSTENNA 78 mg/0.5 mL injection       triamcinolone acetonide (KENALOG) 0.1 % ointment Apply  to affected area two (2) times a day. use thin layer 30 g 0     No Known Allergies    Objective:  Visit Vitals  /78 (BP 1 Location: Left arm, BP Patient Position: Sitting)   Pulse 82   Temp 98.3 °F (36.8 °C) (Oral)   Resp 19   Ht 4' 11\" (1.499 m)   Wt 158 lb (71.7 kg)   LMP 04/10/2011   SpO2 94%   BMI 31.91 kg/m²     Physical Exam:   General appearance - alert, obese, and in no distress obese  Mental status - alert, oriented to person, place, and time  EYE-EOMI  Mouth - mucous membranes moist, pharynx normal without lesions  Neck - supple, no significant adenopathy   Chest - clear to auscultation, no wheezes, rales or rhonchi, symmetric air entry   Heart - normal rate, regular rhythm, normal S1, S2  Abdomen - soft, obese  Ext-peripheral pulses normal, no pedal edema, no clubbing or cyanosis  Skin-Warm and dry.  no hyperpigmentation, vitiligo, or suspicious lesions  Neuro -alert, oriented, normal speech, no focal findings or movement disorder noted      Results for orders placed or performed in visit on 04/26/82   METABOLIC PANEL, BASIC   Result Value Ref Range    Glucose 129 (H) 65 - 99 mg/dL    BUN 11 6 - 24 mg/dL    Creatinine 0.92 0.57 - 1.00 mg/dL    GFR est non-AA 70 >59 mL/min/1.73    GFR est AA 80 >59 mL/min/1.73    BUN/Creatinine ratio 12 9 - 23    Sodium 141 134 - 144 mmol/L    Potassium 3.6 3.5 - 5.2 mmol/L    Chloride 98 96 - 106 mmol/L    CO2 26 20 - 29 mmol/L    Calcium 9.6 8.7 - 10.2 mg/dL   HEMOGLOBIN A1C WITH EAG   Result Value Ref Range    Hemoglobin A1c 5.8 (H) 4.8 - 5.6 %    Estimated average glucose 120 mg/dL       Assessment/Plan:    ICD-10-CM ICD-9-CM    1. Prediabetes R73.03 790.29 HEMOGLOBIN A1C WITH EAG   2. Screen for STD (sexually transmitted disease) Z11.3 V74.5 HIV 1/2 AG/AB, 4TH GENERATION,W RFLX CONFIRM      CHLAMYDIA/GC PCR      T VAGINALIS AMPLIFICATION      T PALLIDUM SCREEN W/REFLEX   3. Colon cancer screening Z12.11 V76.51 REFERRAL TO GASTROENTEROLOGY     Orders Placed This Encounter    CHLAMYDIA/GC PCR     Order Specific Question:   Sample source     Answer:   Urine [258]     Order Specific Question:   Specimen source     Answer:   Urine [258]    HIV 1/2 AG/AB, 4TH GENERATION,W RFLX CONFIRM    T VAGINALIS AMPLIFICATION     Order Specific Question:   Specimen source     Answer:   Urine [258]    T PALLIDUM SCREEN W/REFLEX    HEMOGLOBIN A1C WITH EAG    [facility]     Referral Priority:   Routine     Referral Type:   Consultation     Referral Reason:   Specialty Services Required     Referral Location:   [facility]     Referred to Provider: Sosa Price MD     Requested Specialty:   Gastroenterology     Number of Visits Requested:   1     1. Prediabetes    - HEMOGLOBIN A1C WITH EAG    2. Screen for STD (sexually transmitted disease)    - HIV 1/2 AG/AB, 4TH GENERATION,W RFLX CONFIRM  - CHLAMYDIA/GC PCR  - T VAGINALIS AMPLIFICATION  - T PALLIDUM SCREEN W/REFLEX    3. Colon cancer screening    - REFERRAL TO GASTROENTEROLOGY    There are no Patient Instructions on file for this visit. Follow-up and Dispositions    · Return in about 4 months (around 12/6/2019).            I have reviewed with the patient details of the assessment and plan and all questions were answered. Relevent patient education was performed. The most recent lab findings were reviewed with the patient. An After Visit Summary was printed and given to the patient.

## 2019-08-06 NOTE — PROGRESS NOTES
Chief Complaint   Patient presents with    Labs     Pt want test for HIV     1. Have you been to the ER, urgent care clinic since your last visit? Hospitalized since your last visit? No    2. Have you seen or consulted any other health care providers outside of the [address] [place] [address] since your last visit? Include any pap smears or colon screening.  No

## 2019-08-09 LAB
EST. AVERAGE GLUCOSE BLD GHB EST-MCNC: 120 MG/DL
HBA1C MFR BLD: 5.8 % (ref 4.8–5.6)
HIV 1+2 AB+HIV1 P24 AG SERPL QL IA: NON REACTIVE
RPR SER QL: REACTIVE
RPR SER-TITR: ABNORMAL {TITER}
T PALLIDUM AB SER QL IA: POSITIVE
T VAGINALIS RRNA VAG QL NAA+PROBE: NEGATIVE

## 2019-08-12 PROBLEM — Z86.19 HISTORY OF SYPHILIS: Status: ACTIVE | Noted: 2019-08-12

## 2019-09-04 ENCOUNTER — TELEPHONE (OUTPATIENT)
Dept: INTERNAL MEDICINE CLINIC | Age: 59
End: 2019-09-04

## 2019-09-04 NOTE — TELEPHONE ENCOUNTER
Received: Yesterday   Message Contents   Eugene Noriega [address] Office Pool             General Message/     Caller's first and last name:       Reason for call:       Callback required yes/no and why:no       Best contact number(s): [phone]       Details to clarify the request: pt said she does not want to schedule an appt for a endoscopy  Regarding her stomach issues at this time, said she is doing fine.

## 2019-11-21 ENCOUNTER — HOSPITAL ENCOUNTER (OUTPATIENT)
Dept: MAMMOGRAPHY | Age: 59
Discharge: HOME | End: 2019-11-21
Payer: MEDICAID

## 2019-11-21 DIAGNOSIS — Z12.31 ENCOUNTER FOR MAMMOGRAM TO ESTABLISH BASELINE MAMMOGRAM: ICD-10-CM

## 2019-11-21 PROCEDURE — 77067 SCR MAMMO BI INCL CAD: CPT

## 2020-03-13 DIAGNOSIS — M25.562 CHRONIC PAIN OF LEFT KNEE: ICD-10-CM

## 2020-03-13 DIAGNOSIS — G89.29 CHRONIC PAIN OF LEFT KNEE: ICD-10-CM

## 2020-03-13 RX ORDER — IBUPROFEN 800 MG/1
TABLET ORAL
Qty: 60 TAB | Refills: 2 | Status: SHIPPED | OUTPATIENT
Start: 2020-03-13 | End: 2020-08-24 | Stop reason: SDUPTHER

## 2020-04-22 ENCOUNTER — VIRTUAL VISIT (OUTPATIENT)
Dept: INTERNAL MEDICINE CLINIC | Age: 60
End: 2020-04-22

## 2020-04-22 VITALS — BODY MASS INDEX: 31.85 KG/M2 | HEIGHT: 59 IN | WEIGHT: 158 LBS

## 2020-04-22 DIAGNOSIS — E78.2 MIXED HYPERLIPIDEMIA: ICD-10-CM

## 2020-04-22 DIAGNOSIS — I10 ESSENTIAL HYPERTENSION: Primary | ICD-10-CM

## 2020-04-22 DIAGNOSIS — R73.03 PREDIABETES: ICD-10-CM

## 2020-04-22 DIAGNOSIS — F20.9 SCHIZOPHRENIA, UNSPECIFIED TYPE (HCC): ICD-10-CM

## 2020-04-22 NOTE — PROGRESS NOTES
Aramis Fay is a 64 y.o. female evaluated via telephone on 4/22/2020. Consent:  She and/or health care decision maker is aware that that she may receive a bill for this telephone service, depending on her insurance coverage, and has provided verbal consent to proceed: Yes     Pt has no complaints    Hypertension Review:  The patient has essential hypertension  Diet and Lifestyle: generally follows a  low sodium diet, exercises sporadically  Home BP Monitoring: is not measured at home. Pertinent ROS: taking medications as instructed, no medication side effects noted, no TIA's, no chest pain on exertion, no dyspnea on exertion, no swelling of ankles. BP Readings from Last 3 Encounters:   08/06/19 117/78   07/25/18 120/83   06/25/18 126/84     Prediabetes-  Lab Results   Component Value Date/Time    Hemoglobin A1c 5.8 (H) 08/06/2019 10:59 AM     Schizophrenia-noted     Hep C-recent VL NEGATIVEDocumentation:  I communicated with the patient and/or health care decision maker about     ICD-10-CM ICD-9-CM    1. Essential hypertension I10 401.9    2. Schizophrenia, unspecified type ([facility] F20.9 295.90    3. Prediabetes R73.03 790.29    4. Mixed hyperlipidemia E78.2 272.2      Cont current mngmnt   F/u 3 mths  Details of this discussion including any medical advice provided:      I affirm this is a Patient Initiated Episode with an Established Patient who has not had a related appointment within my department in the past 7 days or scheduled within the next 24 hours. Total Time: minutes: 11-20 minutes    Note: not billable if this call serves to triage the patient into an appointment for the relevant concern      Sundar Romero MD .. [facility]rier

## 2020-04-22 NOTE — PROGRESS NOTES
Chief Complaint   Patient presents with    Hypertension     1. Have you been to the ER, urgent care clinic since your last visit? Hospitalized since your last visit? No    2. Have you seen or consulted any other health care providers outside of the [address] [place] [address] since your last visit? Include any pap smears or colon screening.  No

## 2020-08-24 ENCOUNTER — OFFICE VISIT (OUTPATIENT)
Dept: INTERNAL MEDICINE CLINIC | Age: 60
End: 2020-08-24
Payer: COMMERCIAL

## 2020-08-24 VITALS
TEMPERATURE: 98.4 F | OXYGEN SATURATION: 97 % | HEART RATE: 88 BPM | HEIGHT: 59 IN | RESPIRATION RATE: 19 BRPM | WEIGHT: 161 LBS | SYSTOLIC BLOOD PRESSURE: 122 MMHG | BODY MASS INDEX: 32.46 KG/M2 | DIASTOLIC BLOOD PRESSURE: 80 MMHG

## 2020-08-24 DIAGNOSIS — E78.2 MIXED HYPERLIPIDEMIA: ICD-10-CM

## 2020-08-24 DIAGNOSIS — M25.562 CHRONIC PAIN OF LEFT KNEE: ICD-10-CM

## 2020-08-24 DIAGNOSIS — G89.29 CHRONIC PAIN OF LEFT KNEE: ICD-10-CM

## 2020-08-24 DIAGNOSIS — E66.9 OBESITY (BMI 30.0-34.9): ICD-10-CM

## 2020-08-24 DIAGNOSIS — R73.03 PREDIABETES: Primary | ICD-10-CM

## 2020-08-24 PROCEDURE — 99214 OFFICE O/P EST MOD 30 MIN: CPT | Performed by: INTERNAL MEDICINE

## 2020-08-24 RX ORDER — IBUPROFEN 800 MG/1
800 TABLET ORAL
Qty: 60 TAB | Refills: 2 | Status: SHIPPED | OUTPATIENT
Start: 2020-08-24 | End: 2021-11-30

## 2020-08-24 RX ORDER — BENZTROPINE MESYLATE 1 MG/1
TABLET ORAL
COMMUNITY
Start: 2020-08-06

## 2020-08-24 NOTE — PROGRESS NOTES
Chief Complaint   Patient presents with    Diabetes    Hypertension    Weight Loss     1. Have you been to the ER, urgent care clinic since your last visit? Hospitalized since your last visit? No    2. Have you seen or consulted any other health care providers outside of the [address] [place] [address] since your last visit? Include any pap smears or colon screening.  No

## 2020-08-24 NOTE — PROGRESS NOTES
Eugenia Ty is a 64 y.o. female and presents with Diabetes; Hypertension; and Weight Loss  . Subjective:    Pt comes-in for routine f/u. Pt is concerned re:weight loss. She has gained 3lbs. Wt Readings from Last 3 Encounters:   08/24/20 161 lb (73 kg)   04/22/20 158 lb (71.7 kg)   08/06/19 158 lb (71.7 kg)       Hypertension Review:  The patient has essential hypertension  Diet and Lifestyle: generally follows a  low sodium diet, exercises sporadically  Home BP Monitoring: is not measured at home. Pertinent ROS: taking medications as instructed, no medication side effects noted, no TIA's, no chest pain on exertion, no dyspnea on exertion, no swelling of ankles. BP Readings from Last 3 Encounters:   08/24/20 122/80   08/06/19 117/78   07/25/18 120/83       Schizophrenia-noted     Hep C-recent VL NEGATIVE    Lab Results   Component Value Date/Time    Cholesterol, total 208 (H) 01/30/2018 10:46 AM    Cholesterol (POC) 213 07/02/2014 11:15 AM    HDL Cholesterol 66 01/30/2018 10:46 AM    HDL Cholesterol (POC) 62 07/02/2014 11:15 AM    LDL Cholesterol (POC) 115 07/02/2014 11:15 AM    LDL, calculated 124 (H) 01/30/2018 10:46 AM    VLDL, calculated 18 01/30/2018 10:46 AM    Triglyceride 90 01/30/2018 10:46 AM    Triglycerides (POC) 175 07/02/2014 11:15 AM       Wt Readings from Last 3 Encounters:   08/24/20 161 lb (73 kg)   04/22/20 158 lb (71.7 kg)   08/06/19 158 lb (71.7 kg)       Pts chart review showed she has NOT done her colonoscopy. She is not quite ready. Pt agrees to do mammo now. Review of Systems  Constitutional: negative for fevers, chills, anorexia and weight loss  Eyes:   negative for visual disturbance and irritation  ENT:   negative for tinnitus,sore throat,nasal congestion,ear pains. hoarseness  Respiratory:  negative for cough, hemoptysis, dyspnea,wheezing  CV:   negative for chest pain, palpitations, lower extremity edema  GI:   negative for nausea, vomiting, diarrhea, abdominal pain,melena  Musculoskel: negative for myalgias, arthralgias, back pain, muscle weakness, joint pain  Neurological:  negative for headaches, dizziness, vertigo, memory problems and gait   Behavl/Psych: negative for feelings of anxiety, depression, mood changes    Past Medical History:   Diagnosis Date    Anemia NEC     Bronchitis     Fibroid     Hypertension     Positive hepatitis C antibody test 2017    [facility]    Psychiatric disorder     Schizophrenia ([facility] [address]      Past Surgical History:   Procedure Laterality Date    HX MYOMECTOMY       Social History     Socioeconomic History    Marital status: SINGLE     Spouse name: Not on file    Number of children: Not on file    Years of education: Not on file    Highest education level: Not on file   Tobacco Use    Smoking status: Former Smoker     Packs/day: 1.00     Years: 10.00     Pack years: 10.00     Last attempt to quit: 3/22/2020     Years since quittin.4    Smokeless tobacco: Never Used   Substance and Sexual Activity    Alcohol use: No     Alcohol/week: 0.0 standard drinks    Drug use: No    Sexual activity: Not Currently     Family History   Problem Relation Age of Onset    Alcohol abuse Mother     Alcohol abuse Father      Current Outpatient Medications   Medication Sig Dispense Refill    benztropine (COGENTIN) 1 mg tablet TAKE 1 TABLET BY MOUTH TWICE A DAY      ibuprofen (MOTRIN) 800 mg tablet Take 1 Tab by mouth every eight (8) hours as needed for Pain. Take with food 60 Tab 2    hydroCHLOROthiazide (HYDRODIURIL) 12.5 mg tablet TAKE 1 TABLET BY MOUTH EVERY DAY 90 Tab 1    triamcinolone acetonide (KENALOG) 0.1 % ointment Apply  to affected area two (2) times a day.  use thin layer 30 g 0    INVEGA SUSTENNA 78 mg/0.5 mL injection        No Known Allergies    Objective:  Visit Vitals  /80 (BP 1 Location: Left arm, BP Patient Position: Sitting)   Pulse 88   Temp 98.4 °F (36.9 °C) (Oral)   Resp 19 Ht 4' 11\" (1.499 m)   Wt 161 lb (73 kg)   LMP 04/10/2011   SpO2 97%   BMI 32.52 kg/m²     Physical Exam:   General appearance - alert, obese, and in no distress. Odd affect  Mental status - alert, oriented to person, place, and time  EYE-EOMI   Chest - clear to auscultation, no wheezes, rales or rhonchi, symmetric air entry   Heart - normal rate, regular rhythm, normal S1, S2  Abdomen - soft, obese  Ext-peripheral pulses normal, no pedal edema, no clubbing or cyanosis  Skin-Warm and dry. no hyperpigmentation, vitiligo, or suspicious lesions  Neuro -alert, oriented, normal speech, no focal findings or movement disorder noted      Results for orders placed or performed in visit on 08/06/19   HIV 1/2 AG/AB, 4TH GENERATION,W RFLX CONFIRM   Result Value Ref Range    HIV SCREEN 4TH GENERATION WRFX Non Reactive Non Reactive   T VAGINALIS AMPLIFICATION   Result Value Ref Range    T. vaginalis by KEVIN Negative Negative   T PALLIDUM SCREEN W/REFLEX   Result Value Ref Range    T PALLIDUM AB Positive (A) Negative   HEMOGLOBIN A1C WITH EAG   Result Value Ref Range    Hemoglobin A1c 5.8 (H) 4.8 - 5.6 %    Estimated average glucose 120 mg/dL   RPR   Result Value Ref Range    RPR Reactive (A) Non Reactive   RPR, QUANT   Result Value Ref Range    RPR, QUANT 1:1 (H) NonRea<1:1       Assessment/Plan:    ICD-10-CM ICD-9-CM    1. Prediabetes  R73.03 790.29 TSH REFLEX TO T4      METABOLIC PANEL, COMPREHENSIVE      LIPID PANEL      HEMOGLOBIN A1C WITH EAG      CBC W/O DIFF   2. Mixed hyperlipidemia  E78.2 272.2 TSH REFLEX TO T4      METABOLIC PANEL, COMPREHENSIVE      LIPID PANEL      HEMOGLOBIN A1C WITH EAG      CBC W/O DIFF   3. Chronic pain of left knee  M25.562 719.46 ibuprofen (MOTRIN) 800 mg tablet    G89.29 338.29    4. Obesity (BMI 30.0-34. 9)  E66.9 278.00      Orders Placed This Encounter    TSH REFLEX TO T4    METABOLIC PANEL, COMPREHENSIVE    LIPID PANEL    HEMOGLOBIN A1C WITH EAG    CBC W/O DIFF    benztropine (COGENTIN) 1 mg tablet     Sig: TAKE 1 TABLET BY MOUTH TWICE A DAY    ibuprofen (MOTRIN) 800 mg tablet     Sig: Take 1 Tab by mouth every eight (8) hours as needed for Pain. Take with food     Dispense:  60 Tab     Refill:  2     1. Prediabetes    - TSH REFLEX TO T4  - METABOLIC PANEL, COMPREHENSIVE  - LIPID PANEL  - HEMOGLOBIN A1C WITH EAG  - CBC W/O DIFF    2. Mixed hyperlipidemia    - TSH REFLEX TO T4  - METABOLIC PANEL, COMPREHENSIVE  - LIPID PANEL  - HEMOGLOBIN A1C WITH EAG  - CBC W/O DIFF    3. Chronic pain of left knee    - ibuprofen (MOTRIN) 800 mg tablet; Take 1 Tab by mouth every eight (8) hours as needed for Pain. Take with food  Dispense: 60 Tab; Refill: 2    4. Obesity (BMI 30.0-34. 9)  D/w pt healthy weight and nml BMI      There are no Patient Instructions on file for this visit. Follow-up and Dispositions    · Return in about 4 months (around 12/24/2020) for pap and bp check. I have reviewed with the patient details of the assessment and plan and all questions were answered. Relevent patient education was performed. The most recent lab findings were reviewed with the patient. An After Visit Summary was printed and given to the patient.

## 2020-12-05 DIAGNOSIS — I10 ESSENTIAL HYPERTENSION: ICD-10-CM

## 2020-12-07 RX ORDER — HYDROCHLOROTHIAZIDE 12.5 MG/1
TABLET ORAL
Qty: 90 TAB | Refills: 1 | Status: SHIPPED | OUTPATIENT
Start: 2020-12-07 | End: 2021-05-24

## 2020-12-23 ENCOUNTER — OFFICE VISIT (OUTPATIENT)
Dept: INTERNAL MEDICINE CLINIC | Age: 60
End: 2020-12-23
Payer: COMMERCIAL

## 2020-12-23 VITALS
RESPIRATION RATE: 18 BRPM | OXYGEN SATURATION: 98 % | HEIGHT: 59 IN | SYSTOLIC BLOOD PRESSURE: 139 MMHG | WEIGHT: 160 LBS | HEART RATE: 75 BPM | DIASTOLIC BLOOD PRESSURE: 80 MMHG | BODY MASS INDEX: 32.25 KG/M2 | TEMPERATURE: 97.7 F

## 2020-12-23 DIAGNOSIS — I10 ESSENTIAL HYPERTENSION: Primary | ICD-10-CM

## 2020-12-23 DIAGNOSIS — E66.9 OBESITY (BMI 30.0-34.9): ICD-10-CM

## 2020-12-23 DIAGNOSIS — F20.9 SCHIZOPHRENIA, UNSPECIFIED TYPE (HCC): ICD-10-CM

## 2020-12-23 DIAGNOSIS — E78.2 MIXED HYPERLIPIDEMIA: ICD-10-CM

## 2020-12-23 DIAGNOSIS — R73.03 PREDIABETES: ICD-10-CM

## 2020-12-23 PROCEDURE — 99214 OFFICE O/P EST MOD 30 MIN: CPT | Performed by: INTERNAL MEDICINE

## 2020-12-23 NOTE — PROGRESS NOTES
Chief Complaint   Patient presents with    Blood Pressure Check     Pt taking BP meds daily no missed doses     1. Have you been to the ER, urgent care clinic since your last visit? Hospitalized since your last visit? No    2. Have you seen or consulted any other health care providers outside of the [address] [place] [address] since your last visit? Include any pap smears or colon screening.  No

## 2021-01-27 NOTE — PROGRESS NOTES
Lucius Pruitt is a 64 y.o. female and presents with Blood Pressure Check (Pt taking BP meds daily no missed doses)  . Subjective:    Pt comes-in for routine f/u. Hypertension Review:  The patient has essential hypertension  Diet and Lifestyle: generally follows a  low sodium diet, exercises sporadically  Home BP Monitoring: is not measured at home. Pertinent ROS: taking medications as instructed, no medication side effects noted, no TIA's, no chest pain on exertion, no dyspnea on exertion, no swelling of ankles. BP Readings from Last 3 Encounters:   12/23/20 139/80   08/24/20 122/80   08/06/19 117/78     Prediabetes-  Lab Results   Component Value Date/Time    Hemoglobin A1c 5.8 (H) 08/06/2019 10:59 AM     Schizophrenia-noted     Hep C-recent VL NEGATIVE    Mild hyperlipidemia  Lab Results   Component Value Date/Time    Cholesterol, total 208 (H) 01/30/2018 10:46 AM    Cholesterol (POC) 213 07/02/2014 11:15 AM    HDL Cholesterol 66 01/30/2018 10:46 AM    HDL Cholesterol (POC) 62 07/02/2014 11:15 AM    LDL Cholesterol (POC) 115 07/02/2014 11:15 AM    LDL, calculated 124 (H) 01/30/2018 10:46 AM    VLDL, calculated 18 01/30/2018 10:46 AM    Triglyceride 90 01/30/2018 10:46 AM    Triglycerides (POC) 175 07/02/2014 11:15 AM     Overweight  Wt Readings from Last 3 Encounters:   12/23/20 160 lb (72.6 kg)   08/24/20 161 lb (73 kg)   04/22/20 158 lb (71.7 kg)       Pts chart review showed she has NOT done her colonoscopy. She is not quite ready. Pt agrees to do mammo now. Pt declines pap today    Review of Systems  Constitutional: negative for fevers, chills, anorexia and weight loss  Eyes:   negative for visual disturbance and irritation  ENT:   negative for tinnitus,sore throat,nasal congestion,ear pains. hoarseness  Respiratory:  negative for cough, hemoptysis, dyspnea,wheezing  CV:   negative for chest pain, palpitations, lower extremity edema  GI:   negative for nausea, vomiting, diarrhea, abdominal Immunohistochemistry ([id number]) billing is not performed here. Please use the Immunohistochemistry Stain Billing plan to accomplish this. pain,melena  Musculoskel: negative for myalgias, arthralgias, back pain, muscle weakness, joint pain  Neurological:  negative for headaches, dizziness, vertigo, memory problems and gait   Behavl/Psych: negative for feelings of anxiety, depression, mood changes    Past Medical History:   Diagnosis Date    Anemia NEC     Bronchitis     Fibroid     Hypertension     Positive hepatitis C antibody test 2017    [facility]    Psychiatric disorder     Schizophrenia ([facility] [address]      Past Surgical History:   Procedure Laterality Date    HX MYOMECTOMY       Social History     Socioeconomic History    Marital status: SINGLE     Spouse name: Not on file    Number of children: Not on file    Years of education: Not on file    Highest education level: Not on file   Tobacco Use    Smoking status: Former Smoker     Packs/day: 1.00     Years: 10.00     Pack years: 10.00     Quit date: 3/22/2020     Years since quittin.7    Smokeless tobacco: Never Used   Substance and Sexual Activity    Alcohol use: No     Alcohol/week: 0.0 standard drinks    Drug use: No    Sexual activity: Not Currently     Family History   Problem Relation Age of Onset    Alcohol abuse Mother     Alcohol abuse Father      Current Outpatient Medications   Medication Sig Dispense Refill    hydroCHLOROthiazide (HYDRODIURIL) 12.5 mg tablet TAKE 1 TABLET BY MOUTH EVERY DAY 90 Tab 1    benztropine (COGENTIN) 1 mg tablet TAKE 1 TABLET BY MOUTH TWICE A DAY      ibuprofen (MOTRIN) 800 mg tablet Take 1 Tab by mouth every eight (8) hours as needed for Pain. Take with food 60 Tab 2    triamcinolone acetonide (KENALOG) 0.1 % ointment Apply  to affected area two (2) times a day. use thin layer 30 g 0    INVEGA SUSTENNA 78 mg/0.5 mL injection 78 mg every thirty (30) days.  Indications: schizophrenia with mood changes       No Known Allergies    Objective:  Visit Vitals  /80   Pulse 75   Temp 97.7 °F (36.5 °C) (Temporal) Immunohistochemistry ([id number]) billing is not performed here. Please use the Immunohistochemistry Stain Billing plan to accomplish this. Resp 18   Ht 4' 11\" (1.499 m)   Wt 160 lb (72.6 kg)   LMP 04/10/2011   SpO2 98%   BMI 32.32 kg/m²     Physical Exam:   General appearance - alert, obese, and in no distress. Odd affect  Mental status - alert, oriented to person, place, and time  EYE-EOMI   Chest - clear to auscultation, no wheezes, rales or rhonchi, symmetric air entry   Heart - normal rate, regular rhythm, normal S1, S2  Abdomen - soft, obese  Ext-peripheral pulses normal, no pedal edema, no clubbing or cyanosis  Skin-Warm and dry. no hyperpigmentation, vitiligo, or suspicious lesions  Neuro -alert, oriented, normal speech, no focal findings or movement disorder noted      Results for orders placed or performed in visit on 08/06/19   HIV 1/2 AG/AB, 4TH GENERATION,W RFLX CONFIRM   Result Value Ref Range    HIV SCREEN 4TH GENERATION WRFX Non Reactive Non Reactive   T VAGINALIS AMPLIFICATION   Result Value Ref Range    T. vaginalis by KEVIN Negative Negative   T PALLIDUM SCREEN W/REFLEX   Result Value Ref Range    T PALLIDUM AB Positive (A) Negative   HEMOGLOBIN A1C WITH EAG   Result Value Ref Range    Hemoglobin A1c 5.8 (H) 4.8 - 5.6 %    Estimated average glucose 120 mg/dL   RPR   Result Value Ref Range    RPR Reactive (A) Non Reactive   RPR, QUANT   Result Value Ref Range    RPR, QUANT 1:1 (H) NonRea<1:1       Assessment/Plan:    ICD-10-CM ICD-9-CM    1. Encounter for immunization  Z23 V03.89 CANCELED: INFLUENZA VIRUS VAC QUAD,SPLIT,PRESV FREE SYRINGE IM     No orders of the defined types were placed in this encounter. 1. Essential hypertension  Relatively controlled    2. Schizophrenia, unspecified type ([facility] [address]  F/u psych    3. Obesity (BMI 30.0-34.9)  Noted    4. Prediabetes  Check labs NV    5. Mixed hyperlipidemia  Check labs NV    Pap NV        There are no Patient Instructions on file for this visit. Follow-up and Dispositions    · Return in about 3 months (around 3/23/2021) for PAP and bp.            I have reviewed with the patient details of the assessment and plan and all questions were answered. Relevent patient education was performed. The most recent lab findings were reviewed with the patient. An After Visit Summary was printed and given to the patient.

## 2021-05-22 DIAGNOSIS — I10 ESSENTIAL HYPERTENSION: ICD-10-CM

## 2021-05-24 RX ORDER — HYDROCHLOROTHIAZIDE 12.5 MG/1
TABLET ORAL
Qty: 90 TABLET | Refills: 1 | Status: SHIPPED | OUTPATIENT
Start: 2021-05-24 | End: 2021-11-24

## 2021-11-01 ENCOUNTER — TELEPHONE (OUTPATIENT)
Dept: INTERNAL MEDICINE CLINIC | Age: 61
End: 2021-11-01

## 2021-11-01 DIAGNOSIS — R63.4 WEIGHT LOSS: Primary | ICD-10-CM

## 2021-11-01 NOTE — TELEPHONE ENCOUNTER
----- Message from [facility] sent at 10/29/2021 10:27 AM EDT -----  Subject: Referral Request    QUESTIONS   Reason for referral request? AIDs test   Has the physician seen you for this condition before? No   Preferred Specialist (if applicable)? Do you already have an appointment scheduled? No  Additional Information for Provider? Patient has lost a lot of weight and   would like an AIDs test as soon as possible.   ---------------------------------------------------------------------------  --------------  CALL BACK INFO  What is the best way for the office to contact you? OK to leave message on   voicemail  Preferred Call Back Phone Number?  [phone]

## 2021-11-01 NOTE — TELEPHONE ENCOUNTER
Writer called pt back, two pt identifier verified. Writer relayed message as per provider. Pt verbalized understanding with no further complaints.

## 2021-11-24 DIAGNOSIS — M25.562 CHRONIC PAIN OF LEFT KNEE: ICD-10-CM

## 2021-11-24 DIAGNOSIS — G89.29 CHRONIC PAIN OF LEFT KNEE: ICD-10-CM

## 2021-11-30 RX ORDER — IBUPROFEN 800 MG/1
800 TABLET ORAL
Qty: 60 TABLET | Refills: 2 | Status: SHIPPED | OUTPATIENT
Start: 2021-11-30 | End: 2022-10-12

## 2022-03-16 ENCOUNTER — OFFICE VISIT (OUTPATIENT)
Dept: INTERNAL MEDICINE CLINIC | Age: 62
End: 2022-03-16
Payer: COMMERCIAL

## 2022-03-16 VITALS
TEMPERATURE: 98.3 F | HEIGHT: 59 IN | RESPIRATION RATE: 19 BRPM | WEIGHT: 157 LBS | DIASTOLIC BLOOD PRESSURE: 89 MMHG | HEART RATE: 89 BPM | BODY MASS INDEX: 31.65 KG/M2 | OXYGEN SATURATION: 95 % | SYSTOLIC BLOOD PRESSURE: 119 MMHG

## 2022-03-16 DIAGNOSIS — Z11.3 SCREEN FOR STD (SEXUALLY TRANSMITTED DISEASE): Primary | ICD-10-CM

## 2022-03-16 DIAGNOSIS — F20.9 SCHIZOPHRENIA, UNSPECIFIED TYPE (HCC): ICD-10-CM

## 2022-03-16 PROCEDURE — 99213 OFFICE O/P EST LOW 20 MIN: CPT | Performed by: INTERNAL MEDICINE
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
## 2022-03-18 PROBLEM — E78.2 MIXED HYPERLIPIDEMIA: Status: ACTIVE | Noted: 2017-05-31

## 2022-03-19 PROBLEM — R73.03 PREDIABETES: Status: ACTIVE | Noted: 2018-06-26

## 2022-03-19 PROBLEM — R76.8 HEPATITIS C ANTIBODY POSITIVE IN BLOOD: Status: ACTIVE | Noted: 2017-05-31

## 2022-03-19 PROBLEM — Z86.19 HISTORY OF SYPHILIS: Status: ACTIVE | Noted: 2019-08-12

## 2022-03-19 PROBLEM — E66.811 OBESITY (BMI 30.0-34.9): Status: ACTIVE | Noted: 2020-08-24

## 2022-03-19 PROBLEM — E66.9 OBESITY (BMI 30.0-34.9): Status: ACTIVE | Noted: 2020-08-24

## 2022-09-05 DIAGNOSIS — I10 ESSENTIAL HYPERTENSION: ICD-10-CM

## 2022-09-06 RX ORDER — HYDROCHLOROTHIAZIDE 12.5 MG/1
TABLET ORAL
Qty: 90 TABLET | Refills: 1 | Status: SHIPPED | OUTPATIENT
Start: 2022-09-06

## 2022-10-11 DIAGNOSIS — G89.29 CHRONIC PAIN OF LEFT KNEE: ICD-10-CM

## 2022-10-11 DIAGNOSIS — M25.562 CHRONIC PAIN OF LEFT KNEE: ICD-10-CM

## 2022-10-12 RX ORDER — IBUPROFEN 800 MG/1
800 TABLET ORAL
Qty: 60 TABLET | Refills: 2 | Status: SHIPPED | OUTPATIENT
Start: 2022-10-12

## 2024-09-26 RX ORDER — IBUPROFEN 800 MG/1
TABLET, FILM COATED ORAL
Qty: 60 TABLET | Refills: 2 | OUTPATIENT
Start: 2024-09-26